# Patient Record
Sex: FEMALE | Race: WHITE | NOT HISPANIC OR LATINO | Employment: FULL TIME | URBAN - METROPOLITAN AREA
[De-identification: names, ages, dates, MRNs, and addresses within clinical notes are randomized per-mention and may not be internally consistent; named-entity substitution may affect disease eponyms.]

---

## 2017-01-09 ENCOUNTER — OFFICE VISIT (OUTPATIENT)
Dept: RETAIL CLINIC | Facility: CLINIC | Age: 27
End: 2017-01-09

## 2017-01-09 VITALS
HEIGHT: 62 IN | RESPIRATION RATE: 14 BRPM | DIASTOLIC BLOOD PRESSURE: 94 MMHG | HEART RATE: 82 BPM | WEIGHT: 224 LBS | OXYGEN SATURATION: 99 % | BODY MASS INDEX: 41.22 KG/M2 | TEMPERATURE: 99.3 F | SYSTOLIC BLOOD PRESSURE: 128 MMHG

## 2017-01-09 DIAGNOSIS — J30.89 ENVIRONMENTAL AND SEASONAL ALLERGIES: ICD-10-CM

## 2017-01-09 DIAGNOSIS — R05.9 COUGH: ICD-10-CM

## 2017-01-09 DIAGNOSIS — I10 ESSENTIAL HYPERTENSION: ICD-10-CM

## 2017-01-09 DIAGNOSIS — J06.9 ACUTE URI: Primary | ICD-10-CM

## 2017-01-09 PROBLEM — E66.01 MORBID OBESITY DUE TO EXCESS CALORIES (HCC): Status: ACTIVE | Noted: 2017-01-09

## 2017-01-09 PROBLEM — Z91.09 ENVIRONMENTAL ALLERGIES: Status: ACTIVE | Noted: 2017-01-09

## 2017-01-09 PROBLEM — E03.9 HYPOTHYROID: Status: ACTIVE | Noted: 2017-01-09

## 2017-01-09 PROCEDURE — 99214 OFFICE O/P EST MOD 30 MIN: CPT | Performed by: FAMILY MEDICINE

## 2017-01-09 RX ORDER — NORETHINDRONE 0.35 MG/1
TABLET ORAL
COMMUNITY
Start: 2016-11-03 | End: 2021-04-21

## 2017-01-09 RX ORDER — LEVOTHYROXINE SODIUM 0.03 MG/1
TABLET ORAL
COMMUNITY
Start: 2016-12-23 | End: 2021-04-21

## 2017-01-09 RX ORDER — LORATADINE 10 MG/1
10 TABLET ORAL DAILY
Qty: 90 TABLET | Refills: 3
Start: 2017-01-09 | End: 2021-04-21

## 2017-01-09 RX ORDER — GUAIFENESIN 600 MG/1
TABLET, EXTENDED RELEASE ORAL
Qty: 20 TABLET | Refills: 0
Start: 2017-01-09 | End: 2021-04-21

## 2017-01-09 RX ORDER — DICYCLOMINE HCL 20 MG
TABLET ORAL
COMMUNITY
Start: 2016-12-23 | End: 2021-04-21

## 2017-01-09 RX ORDER — METOPROLOL SUCCINATE 25 MG/1
TABLET, EXTENDED RELEASE ORAL
COMMUNITY
Start: 2016-12-23 | End: 2021-04-21

## 2017-01-09 RX ORDER — BENZONATATE 100 MG/1
CAPSULE ORAL
Qty: 30 CAPSULE | Refills: 0
Start: 2017-01-09 | End: 2021-04-21

## 2017-01-09 RX ORDER — GUAIFENESIN AND CODEINE PHOSPHATE 100; 10 MG/5ML; MG/5ML
SOLUTION ORAL
Qty: 240 ML | Refills: 0
Start: 2017-01-09 | End: 2021-04-21

## 2017-01-09 NOTE — PROGRESS NOTES
"Subjective   Lorena Bryson is a 26 y.o. female presents for   Chief Complaint   Patient presents with   • Cough     5 days   • Nasal Congestion     5 days   .     History of Present Illness    Lorena has had 5 days of nasal congestion, drainage, and cough.  Has been a little more tired than usual but doesn't think she's had any fevers.  Her  has also been sick with something similar.  She cannot take typical decongestants due to her HTN, so she has tried otc Coricidin and Mucinex, but it doesn't seem like it's helping much.  Is staying awake at night coughing.  Does not drink much water.  Otherwise, things have been going well until 5 days ago.  She does have allergies that she takes otc claritin for, but is not good about taking it every single day.  Overall, her allergies are under \"decent\" control.       The following portions of the patient's history were reviewed and updated as appropriate: allergies, current medications, past family history, past medical history, past social history, past surgical history and problem list.    Review of Systems   Constitutional: Positive for fatigue. Negative for activity change, appetite change, chills, diaphoresis and fever.   HENT: Positive for congestion, postnasal drip, sore throat (dry, itchy) and voice change (more hoarse than usual). Negative for ear discharge, ear pain, facial swelling, mouth sores, nosebleeds, rhinorrhea, sinus pressure, sneezing and trouble swallowing.    Eyes: Negative.    Respiratory: Positive for cough. Negative for apnea, choking, chest tightness, shortness of breath, wheezing and stridor.    Cardiovascular: Negative.    Gastrointestinal: Negative.    Endocrine: Negative.    Genitourinary: Negative.    Musculoskeletal: Negative.    Skin: Negative.    Allergic/Immunologic: Negative.    Neurological: Negative.    Hematological: Negative.    Psychiatric/Behavioral: Negative.        Objective   Vitals:    01/09/17 1000   BP: 128/94   Pulse: " "82   Resp: 14   Temp: 99.3 °F (37.4 °C)   TempSrc: Axillary   SpO2: 99%   Weight: 224 lb (102 kg)   Height: 62\" (157.5 cm)     Body mass index is 40.97 kg/(m^2).    Physical Exam   Constitutional: She is oriented to person, place, and time. She appears well-developed and well-nourished. No distress.   HENT:   Head: Normocephalic and atraumatic.   Right Ear: External ear normal.   Left Ear: External ear normal.   Nose: Nose normal.   Mouth/Throat: Oropharynx is clear and moist. No oropharyngeal exudate.   Eyes: Conjunctivae are normal. Pupils are equal, round, and reactive to light. No scleral icterus.   Neck: Neck supple. No thyromegaly present.   Cardiovascular: Normal rate, regular rhythm, normal heart sounds and intact distal pulses.  Exam reveals no gallop and no friction rub.    No murmur heard.  Pulmonary/Chest: Effort normal and breath sounds normal. No respiratory distress. She has no wheezes. She has no rales.   Lymphadenopathy:     She has no cervical adenopathy.   Neurological: She is alert and oriented to person, place, and time. No cranial nerve deficit.   Skin: Skin is warm and dry. No rash noted.   Psychiatric: She has a normal mood and affect. Her behavior is normal.   Nursing note and vitals reviewed.      Assessment/Plan   Lorena was seen today for cough and nasal congestion.    Diagnoses and all orders for this visit:    Acute URI    Cough    Environmental and seasonal allergies    Essential hypertension   Follows with cardiology.  Controlled today.  Other orders  -     loratadine (CLARITIN) 10 MG tablet; Take 1 tablet by mouth Daily.  -     guaiFENesin (MUCINEX) 600 MG 12 hr tablet; 1-2 tabs po BID prn drainage  -     benzonatate (TESSALON PERLES) 100 MG capsule; 1-2 po TID prn cough  -     guaifenesin-codeine (GUAIFENESIN AC) 100-10 MG/5ML liquid; 10mL po q4-6 hrs prn severe cough   Increase water intake.          "

## 2017-03-10 ENCOUNTER — HOSPITAL ENCOUNTER (OUTPATIENT)
Dept: FAMILY MEDICINE CLINIC | Facility: CLINIC | Age: 27
Setting detail: SPECIMEN
Discharge: HOME | End: 2017-03-10
Attending: FAMILY MEDICINE | Admitting: FAMILY MEDICINE

## 2017-03-10 LAB
ALBUMIN SERPL-MCNC: 3.5 G/DL (ref 3.5–4.8)
ALBUMIN/GLOB SERPL: 1.1 {RATIO} (ref 1–1.7)
ALP SERPL-CCNC: 123 IU/L (ref 32–91)
ALT SERPL-CCNC: 18 IU/L (ref 14–54)
ANION GAP SERPL CALC-SCNC: 13.2 MMOL/L (ref 10–20)
AST SERPL-CCNC: 33 IU/L (ref 15–41)
BILIRUB SERPL-MCNC: 0.7 MG/DL (ref 0.3–1.2)
BUN SERPL-MCNC: 7 MG/DL (ref 8–20)
BUN/CREAT SERPL: 8.8 (ref 5.4–26.2)
CALCIUM SERPL-MCNC: 9.6 MG/DL (ref 8.9–10.3)
CHLORIDE SERPL-SCNC: 105 MMOL/L (ref 101–111)
CONV CO2: 26 MMOL/L (ref 22–32)
CONV TOTAL PROTEIN: 6.6 G/DL (ref 6.1–7.9)
CREAT UR-MCNC: 0.8 MG/DL (ref 0.4–1)
GLOBULIN UR ELPH-MCNC: 3.1 G/DL (ref 2.5–3.8)
GLUCOSE SERPL-MCNC: 112 MG/DL (ref 65–99)
POTASSIUM SERPL-SCNC: 4.2 MMOL/L (ref 3.6–5.1)
SODIUM SERPL-SCNC: 140 MMOL/L (ref 136–144)
TSH SERPL-ACNC: 2.68 UIU/ML (ref 0.34–5.6)
VIT B12 SERPL-MCNC: 221 PG/ML (ref 180–914)

## 2017-09-05 ENCOUNTER — OFFICE VISIT (OUTPATIENT)
Dept: RETAIL CLINIC | Facility: CLINIC | Age: 27
End: 2017-09-05

## 2017-09-05 VITALS — TEMPERATURE: 98.4 F | HEART RATE: 62 BPM | OXYGEN SATURATION: 99 %

## 2017-09-05 DIAGNOSIS — Z79.899 MEDICATION MANAGEMENT: ICD-10-CM

## 2017-09-05 DIAGNOSIS — R53.83 FATIGUE, UNSPECIFIED TYPE: ICD-10-CM

## 2017-09-05 DIAGNOSIS — E03.9 HYPOTHYROIDISM, UNSPECIFIED TYPE: Primary | ICD-10-CM

## 2017-09-05 DIAGNOSIS — Z13.220 SCREENING CHOLESTEROL LEVEL: ICD-10-CM

## 2017-09-05 PROCEDURE — 36415 COLL VENOUS BLD VENIPUNCTURE: CPT | Performed by: FAMILY MEDICINE

## 2017-09-05 PROCEDURE — 99214 OFFICE O/P EST MOD 30 MIN: CPT | Performed by: FAMILY MEDICINE

## 2017-09-05 RX ORDER — LEVOTHYROXINE SODIUM 0.03 MG/1
25 TABLET ORAL DAILY
COMMUNITY
End: 2017-09-05 | Stop reason: SDUPTHER

## 2017-09-05 RX ORDER — METOPROLOL SUCCINATE 25 MG/1
25 TABLET, EXTENDED RELEASE ORAL DAILY
Qty: 30 TABLET | Refills: 11 | Status: SHIPPED | OUTPATIENT
Start: 2017-09-05 | End: 2021-04-21

## 2017-09-05 RX ORDER — METOPROLOL SUCCINATE 25 MG/1
25 TABLET, EXTENDED RELEASE ORAL DAILY
COMMUNITY
End: 2017-09-05 | Stop reason: SDUPTHER

## 2017-09-05 RX ORDER — DICYCLOMINE HYDROCHLORIDE 10 MG/1
20 CAPSULE ORAL 2 TIMES DAILY
Qty: 60 CAPSULE | Refills: 11 | Status: SHIPPED | OUTPATIENT
Start: 2017-09-05 | End: 2021-04-21

## 2017-09-05 RX ORDER — LEVOTHYROXINE SODIUM 0.03 MG/1
25 TABLET ORAL DAILY
Qty: 30 TABLET | Refills: 11 | Status: SHIPPED | OUTPATIENT
Start: 2017-09-05 | End: 2021-04-21

## 2017-09-05 RX ORDER — DICYCLOMINE HYDROCHLORIDE 10 MG/1
20 CAPSULE ORAL DAILY
COMMUNITY
End: 2017-09-05 | Stop reason: SDUPTHER

## 2017-09-05 NOTE — PROGRESS NOTES
"Subjective   Lorena Bryson is a 26 y.o. female presents for   Chief Complaint   Patient presents with   • Med Refill     dicyclomine  Levothyroxine and Metoprolol        History of Present Illness    Lorena is here for medication refills.  She is taking dicyclomine for IBS once to twice daily.  Her IBS is at baseline.  She is also taking Levothyroxine for hypothyroid.  She thinks that her last TSH was checked at the beginning of the year by her previous PCP.  She's not sure what her levels are or what type of hypothyroidism she has.  She has an arrhythmia that she takes Metoprolol for.  She has a follow up appointment with cardiology scheduled and usually sees them on a yearly basis.  She has an upcoming appointment with her gyn for evaluation of possible endometriosis.  Her mother had severe endometriosis requiring several surgeries on her colon.  Her mother struggled with infertility because of it so she \"wants to get on top of it, sooner rather than later.\"  She states that her periods are getting worse with pain and heaviness, where she is passing clots.  She says that the \"miserable part\" lasts for a day and then the rest of period is fairly light and lasts 4 more days on average.  She used to be on a birth control pill to see if it would help her periods, but it \"made me foggy\", so she stopped taking it.  She is unsure of the name.      The following portions of the patient's history were reviewed and updated as appropriate: allergies, current medications, past family history, past medical history, past social history, past surgical history and problem list.    Review of Systems   Constitutional: Positive for fatigue. Negative for activity change, appetite change, chills, diaphoresis, fever and unexpected weight change.   HENT: Negative.    Eyes: Negative.    Respiratory: Negative.    Cardiovascular: Negative.    Gastrointestinal: Negative.    Endocrine: Negative.    Genitourinary: Positive for menstrual " problem and pelvic pain. Negative for decreased urine volume, difficulty urinating, dyspareunia, dysuria, enuresis, flank pain, frequency, genital sores, hematuria, urgency, vaginal bleeding, vaginal discharge and vaginal pain.   Musculoskeletal: Negative.    Skin: Negative.    Allergic/Immunologic: Negative.    Neurological: Negative.    Hematological: Negative.    Psychiatric/Behavioral: Negative.        Objective   Vitals:    09/05/17 1105   Pulse: 62   Temp: 98.4 °F (36.9 °C)   TempSrc: Oral   SpO2: 99%       Physical Exam   Constitutional: She appears well-developed and well-nourished. No distress.   HENT:   Head: Normocephalic and atraumatic.   Right Ear: Tympanic membrane, external ear and ear canal normal.   Left Ear: Tympanic membrane, external ear and ear canal normal.   Nose: Nose normal.   Mouth/Throat: Uvula is midline, oropharynx is clear and moist and mucous membranes are normal. No oropharyngeal exudate. No tonsillar exudate.   Eyes: Conjunctivae and EOM are normal. Pupils are equal, round, and reactive to light. No scleral icterus.   Neck: Neck supple. No thyromegaly present.   Cardiovascular: Normal rate, regular rhythm, S1 normal, S2 normal, normal heart sounds and intact distal pulses.   No extrasystoles are present. Exam reveals no gallop, no S3, no S4, no distant heart sounds and no friction rub.    No murmur heard.  Pulmonary/Chest: Effort normal and breath sounds normal. No respiratory distress. She has no decreased breath sounds. She has no wheezes. She has no rhonchi. She has no rales.   Lymphadenopathy:     She has no cervical adenopathy.   Skin: Skin is warm and dry. No rash noted. She is not diaphoretic.   Psychiatric: She has a normal mood and affect.   Nursing note and vitals reviewed.      Assessment/Plan   Lorena was seen today for med refill.    Diagnoses and all orders for this visit:    Hypothyroidism, unspecified type  -     TSH  -     T4, free    Medication management  -      Comprehensive Metabolic Panel    Fatigue, unspecified type  -     CBC & Differential  -     Vitamin D 25 Hydroxy  -     Hemoglobin A1c    Screening cholesterol level  -     Lipid Panel    Other orders  -     dicyclomine (BENTYL) 10 MG capsule; Take 2 capsules by mouth 2 (Two) Times a Day.  -     levothyroxine (SYNTHROID, LEVOTHROID) 25 MCG tablet; Take 1 tablet by mouth Daily.  -     metoprolol succinate XL (TOPROL-XL) 25 MG 24 hr tablet; Take 1 tablet by mouth Daily.     Keep appointments with gyn and cardiology.

## 2017-09-06 ENCOUNTER — TELEPHONE (OUTPATIENT)
Dept: RETAIL CLINIC | Facility: CLINIC | Age: 27
End: 2017-09-06

## 2017-09-06 LAB
25(OH)D3+25(OH)D2 SERPL-MCNC: 21.5 NG/ML (ref 30–100)
ALBUMIN SERPL-MCNC: 4.5 G/DL (ref 3.5–5.5)
ALBUMIN/GLOB SERPL: 1.5 {RATIO} (ref 1.2–2.2)
ALP SERPL-CCNC: 123 IU/L (ref 39–117)
ALT SERPL-CCNC: 15 IU/L (ref 0–32)
AST SERPL-CCNC: 30 IU/L (ref 0–40)
BASOPHILS # BLD AUTO: 0 X10E3/UL (ref 0–0.2)
BASOPHILS NFR BLD AUTO: 0 %
BILIRUB SERPL-MCNC: 0.6 MG/DL (ref 0–1.2)
BUN SERPL-MCNC: 9 MG/DL (ref 6–20)
BUN/CREAT SERPL: 12 (ref 9–23)
CALCIUM SERPL-MCNC: 9.8 MG/DL (ref 8.7–10.2)
CHLORIDE SERPL-SCNC: 100 MMOL/L (ref 96–106)
CHOLEST SERPL-MCNC: 260 MG/DL (ref 100–199)
CO2 SERPL-SCNC: 20 MMOL/L (ref 18–29)
CREAT SERPL-MCNC: 0.76 MG/DL (ref 0.57–1)
EOSINOPHIL # BLD AUTO: 0.2 X10E3/UL (ref 0–0.4)
EOSINOPHIL NFR BLD AUTO: 3 %
ERYTHROCYTE [DISTWIDTH] IN BLOOD BY AUTOMATED COUNT: 13.4 % (ref 12.3–15.4)
GLOBULIN SER CALC-MCNC: 3 G/DL (ref 1.5–4.5)
GLUCOSE SERPL-MCNC: 93 MG/DL (ref 65–99)
HBA1C MFR BLD: 5.1 % (ref 4.8–5.6)
HCT VFR BLD AUTO: 43.2 % (ref 34–46.6)
HDLC SERPL-MCNC: 49 MG/DL
HGB BLD-MCNC: 14.3 G/DL (ref 11.1–15.9)
IMM GRANULOCYTES # BLD: 0 X10E3/UL (ref 0–0.1)
IMM GRANULOCYTES NFR BLD: 0 %
LDLC SERPL CALC-MCNC: 179 MG/DL (ref 0–99)
LYMPHOCYTES # BLD AUTO: 1.6 X10E3/UL (ref 0.7–3.1)
LYMPHOCYTES NFR BLD AUTO: 24 %
MCH RBC QN AUTO: 28.4 PG (ref 26.6–33)
MCHC RBC AUTO-ENTMCNC: 33.1 G/DL (ref 31.5–35.7)
MCV RBC AUTO: 86 FL (ref 79–97)
MONOCYTES # BLD AUTO: 0.4 X10E3/UL (ref 0.1–0.9)
MONOCYTES NFR BLD AUTO: 6 %
NEUTROPHILS # BLD AUTO: 4.6 X10E3/UL (ref 1.4–7)
NEUTROPHILS NFR BLD AUTO: 67 %
PLATELET # BLD AUTO: 270 X10E3/UL (ref 150–379)
POTASSIUM SERPL-SCNC: 4.3 MMOL/L (ref 3.5–5.2)
PROT SERPL-MCNC: 7.5 G/DL (ref 6–8.5)
RBC # BLD AUTO: 5.04 X10E6/UL (ref 3.77–5.28)
SODIUM SERPL-SCNC: 140 MMOL/L (ref 134–144)
T4 FREE SERPL-MCNC: 1.45 NG/DL (ref 0.82–1.77)
TRIGL SERPL-MCNC: 159 MG/DL (ref 0–149)
TSH SERPL DL<=0.005 MIU/L-ACNC: 2.64 UIU/ML (ref 0.45–4.5)
VLDLC SERPL CALC-MCNC: 32 MG/DL (ref 5–40)
WBC # BLD AUTO: 6.8 X10E3/UL (ref 3.4–10.8)

## 2017-09-06 NOTE — TELEPHONE ENCOUNTER
----- Message from Kalyn Ashby MD sent at 9/6/2017  9:57 AM EDT -----  Labs show:  Normal cell counts, no anemia, normal platelets  Normal blood sugar, electrolytes, kidneys, and liver  Normal thyroid (I would continue the Levothyroxine 25mcg daily)  Normal diabetes screen  Low vitamin D.  Start OTC vitamin D at 4000 IU daily.  High cholesterol.  Work on diet, exercise, weight loss and recheck in 3 months.  Patient notified of the above. No questions at this time

## 2021-01-26 ENCOUNTER — E-VISIT (OUTPATIENT)
Dept: FAMILY MEDICINE CLINIC | Facility: TELEHEALTH | Age: 31
End: 2021-01-26

## 2021-01-26 DIAGNOSIS — Z20.822 EXPOSURE TO COVID-19 VIRUS: Primary | ICD-10-CM

## 2021-01-26 PROCEDURE — 99422 OL DIG E/M SVC 11-20 MIN: CPT | Performed by: NURSE PRACTITIONER

## 2021-01-27 NOTE — PATIENT INSTRUCTIONS

## 2021-01-27 NOTE — PROGRESS NOTES
Lorena Bryson    1990  [id number]    I have reviewed the e-Visit questionnaire and patient's answers, my assessment and plan are as follows:      HPI  Lorena Bryson is a 30 y.o. with exposure to COVID. She reports she has been having allergy symptoms. Denies any fever, chills. Nonsmoker. Reports she was exposed to someone with flu in the last 2 weeks. Denies any body aches. Denies taking any medication for her symptoms. Denies pregnancy or breastfeeding. LMP 1-1-21.    Review of Systems - Negative except as below  History obtained from chart review and the patient  Allergy and Immunology ROS: positive for - seasonal allergies      Diagnoses and all orders for this visit:    1. Exposure to COVID-19 virus (Primary)  -     COVID-19,[facility] ROUTINE, NP/OP SWAB IN TRANSPORT MEDIA OR ESWAB 72 HR TAT - Swab, Nasopharynx; Future    quarantine for total of 10 days from first day of symptoms   ER for any worsening symptoms   F/u with PCP for persistent symptoms     Any medications prescribed have been sent electronically to   [facility] - [address] [place] [address] - [phone]  - [phone] [address] [place] [address]  Phone: [phone] Fax: [phone]      Time Documentation  Counseled patient  Counseling topics: diagnosis, treatment options, follow up plan and return instructions  Total encounter time: counseling time more than 50% of visit: 15 minutes        Johanna Earl, APRN  01/26/21  23:40 EST

## 2021-04-21 ENCOUNTER — OFFICE VISIT (OUTPATIENT)
Dept: FAMILY MEDICINE CLINIC | Facility: CLINIC | Age: 31
End: 2021-04-21

## 2021-04-21 VITALS
BODY MASS INDEX: 39.51 KG/M2 | TEMPERATURE: 97.3 F | SYSTOLIC BLOOD PRESSURE: 137 MMHG | HEART RATE: 108 BPM | OXYGEN SATURATION: 95 % | WEIGHT: 223 LBS | DIASTOLIC BLOOD PRESSURE: 103 MMHG | HEIGHT: 63 IN

## 2021-04-21 DIAGNOSIS — Z00.00 WELLNESS EXAMINATION: Primary | ICD-10-CM

## 2021-04-21 DIAGNOSIS — E55.9 VITAMIN D DEFICIENCY: ICD-10-CM

## 2021-04-21 PROCEDURE — 99395 PREV VISIT EST AGE 18-39: CPT | Performed by: FAMILY MEDICINE

## 2021-05-07 ENCOUNTER — LAB (OUTPATIENT)
Dept: FAMILY MEDICINE CLINIC | Facility: CLINIC | Age: 31
End: 2021-05-07

## 2021-05-07 PROCEDURE — 84443 ASSAY THYROID STIM HORMONE: CPT | Performed by: FAMILY MEDICINE

## 2021-05-07 PROCEDURE — 36415 COLL VENOUS BLD VENIPUNCTURE: CPT | Performed by: FAMILY MEDICINE

## 2021-05-07 PROCEDURE — 80053 COMPREHEN METABOLIC PANEL: CPT | Performed by: FAMILY MEDICINE

## 2021-05-07 PROCEDURE — 80061 LIPID PANEL: CPT | Performed by: FAMILY MEDICINE

## 2021-05-07 PROCEDURE — 82306 VITAMIN D 25 HYDROXY: CPT | Performed by: FAMILY MEDICINE

## 2021-05-08 LAB
25(OH)D3 SERPL-MCNC: 12.9 NG/ML (ref 30–100)
ALBUMIN SERPL-MCNC: 4.1 G/DL (ref 3.5–5.2)
ALBUMIN/GLOB SERPL: 1.5 G/DL
ALP SERPL-CCNC: 116 U/L (ref 39–117)
ALT SERPL W P-5'-P-CCNC: 11 U/L (ref 1–33)
ANION GAP SERPL CALCULATED.3IONS-SCNC: 13.3 MMOL/L (ref 5–15)
AST SERPL-CCNC: 26 U/L (ref 1–32)
BILIRUB SERPL-MCNC: 0.5 MG/DL (ref 0–1.2)
BUN SERPL-MCNC: 8 MG/DL (ref 6–20)
BUN/CREAT SERPL: 11.9 (ref 7–25)
CALCIUM SPEC-SCNC: 9.4 MG/DL (ref 8.6–10.5)
CHLORIDE SERPL-SCNC: 105 MMOL/L (ref 98–107)
CHOLEST SERPL-MCNC: 239 MG/DL (ref 0–200)
CO2 SERPL-SCNC: 22.7 MMOL/L (ref 22–29)
CREAT SERPL-MCNC: 0.67 MG/DL (ref 0.57–1)
GFR SERPL CREATININE-BSD FRML MDRD: 103 ML/MIN/1.73
GLOBULIN UR ELPH-MCNC: 2.8 GM/DL
GLUCOSE SERPL-MCNC: 88 MG/DL (ref 65–99)
HDLC SERPL-MCNC: 48 MG/DL (ref 40–60)
LDLC SERPL CALC-MCNC: 164 MG/DL (ref 0–100)
LDLC/HDLC SERPL: 3.36 {RATIO}
POTASSIUM SERPL-SCNC: 4 MMOL/L (ref 3.5–5.2)
PROT SERPL-MCNC: 6.9 G/DL (ref 6–8.5)
SODIUM SERPL-SCNC: 141 MMOL/L (ref 136–145)
TRIGL SERPL-MCNC: 148 MG/DL (ref 0–150)
TSH SERPL DL<=0.05 MIU/L-ACNC: 3.62 UIU/ML (ref 0.27–4.2)
VLDLC SERPL-MCNC: 27 MG/DL (ref 5–40)

## 2021-05-09 PROBLEM — E55.9 VITAMIN D DEFICIENCY: Status: ACTIVE | Noted: 2021-05-09

## 2021-05-09 PROBLEM — Z00.00 WELLNESS EXAMINATION: Status: ACTIVE | Noted: 2021-05-09

## 2021-05-11 RX ORDER — ERGOCALCIFEROL 1.25 MG/1
50000 CAPSULE ORAL WEEKLY
Qty: 13 CAPSULE | Refills: 0 | Status: SHIPPED | OUTPATIENT
Start: 2021-05-11 | End: 2021-08-10 | Stop reason: SDUPTHER

## 2021-08-10 RX ORDER — ERGOCALCIFEROL 1.25 MG/1
50000 CAPSULE ORAL WEEKLY
Qty: 13 CAPSULE | Refills: 0 | Status: CANCELLED | OUTPATIENT
Start: 2021-08-10

## 2021-08-13 RX ORDER — ERGOCALCIFEROL 1.25 MG/1
50000 CAPSULE ORAL WEEKLY
Qty: 13 CAPSULE | Refills: 0 | Status: SHIPPED | OUTPATIENT
Start: 2021-08-13

## 2021-12-20 ENCOUNTER — TELEMEDICINE (OUTPATIENT)
Dept: FAMILY MEDICINE CLINIC | Facility: CLINIC | Age: 31
End: 2021-12-20

## 2021-12-20 ENCOUNTER — LAB (OUTPATIENT)
Dept: LAB | Facility: HOSPITAL | Age: 31
End: 2021-12-20

## 2021-12-20 DIAGNOSIS — J06.9 ACUTE URI: Primary | ICD-10-CM

## 2021-12-20 PROCEDURE — U0004 COV-19 TEST NON-CDC HGH THRU: HCPCS | Performed by: FAMILY MEDICINE

## 2021-12-20 PROCEDURE — 99213 OFFICE O/P EST LOW 20 MIN: CPT | Performed by: FAMILY MEDICINE

## 2021-12-20 RX ORDER — AZITHROMYCIN 250 MG/1
TABLET, FILM COATED ORAL
Qty: 6 TABLET | Refills: 0 | Status: SHIPPED | OUTPATIENT
Start: 2021-12-20 | End: 2021-12-29

## 2021-12-20 NOTE — PROGRESS NOTES
Chief Complaint  Cough    You have chosen to receive care through a telehealth visit.  Do you consent to use a video/audio connection for your medical care today? Yes    Patient presents during the COVID-19 pandemic/federally declared state of public health emergency.  This service was conducted via [facility]  real-time audio/visual.      Subjective          Lorena Bryson presents to [facility]  No known exposure to COVID.  No loss of taste or smell.  No fever or chills.    Cough  This is a new problem. The current episode started in the past 7 days. The problem has been unchanged. The cough is non-productive. Associated symptoms include ear congestion and a sore throat. Pertinent negatives include no chills or fever. Nothing aggravates the symptoms. She has tried nothing for the symptoms.       Objective   Vital Signs:   There were no vitals taken for this visit.    Physical Exam  Pulmonary:      Effort: Pulmonary effort is normal.   Neurological:      Mental Status: She is alert.   Psychiatric:         Mood and Affect: Mood normal.        Result Review :   The following data was reviewed by: Eden Rojas MD on 12/20/2021:  Common labs    Common Labsle 5/7/21 5/7/21    1326 1326   Glucose 88    BUN 8    Creatinine 0.67    eGFR Non African Am 103    Sodium 141    Potassium 4.0    Chloride 105    Calcium 9.4    Albumin 4.10    Total Bilirubin 0.5    Alkaline Phosphatase 116    AST (SGOT) 26    ALT (SGPT) 11    Total Cholesterol  239 (A)   Triglycerides  148   HDL Cholesterol  48   LDL Cholesterol   164 (A)   (A) Abnormal value                      Assessment and Plan    Diagnoses and all orders for this visit:    1. Acute URI (Primary)  -     COVID-19,[facility] PANTHER([facility]), [facility]/ [facility], NP/OP SWAB IN UTM/VTM/SALINE TRANSPORT MEDIA,24 HR TAT - Swab, Nasal Cavity    Other orders  -     azithromycin (Zithromax Z-Ricci) 250 MG tablet; Take 2 tablets the first day, then 1 tablet daily  for 4 days.  Dispense: 6 tablet; Refill: 0        Follow Up   No follow-ups on file.  Patient was given instructions and counseling regarding her condition or for health maintenance advice. Please see specific information pulled into the AVS if appropriate.

## 2021-12-21 LAB — SARS-COV-2 ORF1AB RESP QL NAA+PROBE: DETECTED

## 2021-12-22 ENCOUNTER — TELEPHONE (OUTPATIENT)
Dept: FAMILY MEDICINE CLINIC | Facility: CLINIC | Age: 31
End: 2021-12-22

## 2021-12-22 RX ORDER — BENZONATATE 200 MG/1
200 CAPSULE ORAL 3 TIMES DAILY PRN
Qty: 30 CAPSULE | Refills: 0 | Status: SHIPPED | OUTPATIENT
Start: 2021-12-22

## 2021-12-22 NOTE — TELEPHONE ENCOUNTER
Caller: Lorena Bryson    Relationship: Self    Best call back numbeR [phone]    What medication are you requesting: COUGH MEDICATION, NICKSALON ALMA ROSA    What are your current symptoms: PATIENT DIAGNOSED WITH COVID HAS ZPACK COUGH GETTING WORSE    If a prescription is needed, what is your preferred pharmacy and phone number:    [facility]  [phone]    Additional notes:PLEASE ADVISE

## 2021-12-29 ENCOUNTER — OFFICE VISIT (OUTPATIENT)
Dept: FAMILY MEDICINE CLINIC | Facility: CLINIC | Age: 31
End: 2021-12-29

## 2021-12-29 ENCOUNTER — HOSPITAL ENCOUNTER (OUTPATIENT)
Dept: GENERAL RADIOLOGY | Facility: HOSPITAL | Age: 31
Discharge: HOME | End: 2021-12-29
Admitting: FAMILY MEDICINE

## 2021-12-29 VITALS
DIASTOLIC BLOOD PRESSURE: 109 MMHG | BODY MASS INDEX: 39.15 KG/M2 | TEMPERATURE: 97.8 F | WEIGHT: 221 LBS | HEART RATE: 82 BPM | SYSTOLIC BLOOD PRESSURE: 156 MMHG | OXYGEN SATURATION: 98 %

## 2021-12-29 DIAGNOSIS — U07.1 COVID-19 VIRUS INFECTION: Primary | ICD-10-CM

## 2021-12-29 PROCEDURE — 71046 X-RAY EXAM CHEST 2 VIEWS: CPT

## 2021-12-29 PROCEDURE — 99213 OFFICE O/P EST LOW 20 MIN: CPT | Performed by: FAMILY MEDICINE

## 2021-12-29 RX ORDER — PREDNISONE 10 MG/1
10 TABLET ORAL 2 TIMES DAILY
Qty: 10 TABLET | Refills: 0 | Status: SHIPPED | OUTPATIENT
Start: 2021-12-29

## 2021-12-29 RX ORDER — CYCLOBENZAPRINE HCL 5 MG
5-10 TABLET ORAL
COMMUNITY
Start: 2021-12-22

## 2021-12-30 ENCOUNTER — HOSPITAL ENCOUNTER (EMERGENCY)
Facility: HOSPITAL | Age: 31
Discharge: HOME | End: 2021-12-30
Admitting: EMERGENCY MEDICINE

## 2021-12-30 ENCOUNTER — APPOINTMENT (OUTPATIENT)
Dept: GENERAL RADIOLOGY | Facility: HOSPITAL | Age: 31
End: 2021-12-30

## 2021-12-30 VITALS
WEIGHT: 222.66 LBS | HEIGHT: 63 IN | RESPIRATION RATE: 21 BRPM | DIASTOLIC BLOOD PRESSURE: 96 MMHG | SYSTOLIC BLOOD PRESSURE: 142 MMHG | OXYGEN SATURATION: 99 % | HEART RATE: 89 BPM | TEMPERATURE: 98.4 F | BODY MASS INDEX: 39.45 KG/M2

## 2021-12-30 DIAGNOSIS — I10 ELEVATED BLOOD PRESSURE READING WITH DIAGNOSIS OF HYPERTENSION: Primary | ICD-10-CM

## 2021-12-30 DIAGNOSIS — F41.9 ANXIETY: ICD-10-CM

## 2021-12-30 LAB
ALBUMIN SERPL-MCNC: 3.9 G/DL (ref 3.5–5.2)
ALBUMIN/GLOB SERPL: 1.2 G/DL
ALP SERPL-CCNC: 110 U/L (ref 39–117)
ALT SERPL W P-5'-P-CCNC: 11 U/L (ref 1–33)
ANION GAP SERPL CALCULATED.3IONS-SCNC: 12 MMOL/L (ref 5–15)
AST SERPL-CCNC: 21 U/L (ref 1–32)
BACTERIA UR QL AUTO: ABNORMAL /HPF
BASOPHILS # BLD AUTO: 0.1 10*3/MM3 (ref 0–0.2)
BASOPHILS NFR BLD AUTO: 0.7 % (ref 0–1.5)
BILIRUB SERPL-MCNC: 0.2 MG/DL (ref 0–1.2)
BILIRUB UR QL STRIP: NEGATIVE
BUN SERPL-MCNC: 11 MG/DL (ref 6–20)
BUN/CREAT SERPL: 13.9 (ref 7–25)
CALCIUM SPEC-SCNC: 9.3 MG/DL (ref 8.6–10.5)
CHLORIDE SERPL-SCNC: 104 MMOL/L (ref 98–107)
CLARITY UR: CLEAR
CO2 SERPL-SCNC: 23 MMOL/L (ref 22–29)
COLOR UR: YELLOW
CREAT SERPL-MCNC: 0.79 MG/DL (ref 0.57–1)
DEPRECATED RDW RBC AUTO: 38.5 FL (ref 37–54)
EOSINOPHIL # BLD AUTO: 0.2 10*3/MM3 (ref 0–0.4)
EOSINOPHIL NFR BLD AUTO: 2.5 % (ref 0.3–6.2)
ERYTHROCYTE [DISTWIDTH] IN BLOOD BY AUTOMATED COUNT: 13.4 % (ref 12.3–15.4)
GFR SERPL CREATININE-BSD FRML MDRD: 84 ML/MIN/1.73
GLOBULIN UR ELPH-MCNC: 3.3 GM/DL
GLUCOSE SERPL-MCNC: 102 MG/DL (ref 65–99)
GLUCOSE UR STRIP-MCNC: NEGATIVE MG/DL
HCG SERPL QL: NEGATIVE
HCT VFR BLD AUTO: 38.2 % (ref 34–46.6)
HGB BLD-MCNC: 12.9 G/DL (ref 12–15.9)
HGB UR QL STRIP.AUTO: ABNORMAL
HOLD SPECIMEN: NORMAL
HOLD SPECIMEN: NORMAL
HYALINE CASTS UR QL AUTO: ABNORMAL /LPF
KETONES UR QL STRIP: NEGATIVE
LEUKOCYTE ESTERASE UR QL STRIP.AUTO: NEGATIVE
LYMPHOCYTES # BLD AUTO: 2.8 10*3/MM3 (ref 0.7–3.1)
LYMPHOCYTES NFR BLD AUTO: 35.3 % (ref 19.6–45.3)
MCH RBC QN AUTO: 28 PG (ref 26.6–33)
MCHC RBC AUTO-ENTMCNC: 33.8 G/DL (ref 31.5–35.7)
MCV RBC AUTO: 82.8 FL (ref 79–97)
MONOCYTES # BLD AUTO: 0.5 10*3/MM3 (ref 0.1–0.9)
MONOCYTES NFR BLD AUTO: 6.4 % (ref 5–12)
NEUTROPHILS NFR BLD AUTO: 4.4 10*3/MM3 (ref 1.7–7)
NEUTROPHILS NFR BLD AUTO: 55.1 % (ref 42.7–76)
NITRITE UR QL STRIP: NEGATIVE
NRBC BLD AUTO-RTO: 0.1 /100 WBC (ref 0–0.2)
PH UR STRIP.AUTO: 6 [PH] (ref 5–8)
PLATELET # BLD AUTO: 270 10*3/MM3 (ref 140–450)
PMV BLD AUTO: 8.4 FL (ref 6–12)
POTASSIUM SERPL-SCNC: 3.6 MMOL/L (ref 3.5–5.2)
PROT SERPL-MCNC: 7.2 G/DL (ref 6–8.5)
PROT UR QL STRIP: NEGATIVE
RBC # BLD AUTO: 4.61 10*6/MM3 (ref 3.77–5.28)
RBC # UR STRIP: ABNORMAL /HPF
REF LAB TEST METHOD: ABNORMAL
SODIUM SERPL-SCNC: 139 MMOL/L (ref 136–145)
SP GR UR STRIP: 1.01 (ref 1–1.03)
SQUAMOUS #/AREA URNS HPF: ABNORMAL /HPF
TROPONIN T SERPL-MCNC: <0.01 NG/ML (ref 0–0.03)
TROPONIN T SERPL-MCNC: <0.01 NG/ML (ref 0–0.03)
UROBILINOGEN UR QL STRIP: ABNORMAL
WBC # UR STRIP: ABNORMAL /HPF
WBC NRBC COR # BLD: 8 10*3/MM3 (ref 3.4–10.8)
WHOLE BLOOD HOLD SPECIMEN: NORMAL
WHOLE BLOOD HOLD SPECIMEN: NORMAL

## 2021-12-30 PROCEDURE — 84703 CHORIONIC GONADOTROPIN ASSAY: CPT | Performed by: NURSE PRACTITIONER

## 2021-12-30 PROCEDURE — 93005 ELECTROCARDIOGRAM TRACING: CPT | Performed by: NURSE PRACTITIONER

## 2021-12-30 PROCEDURE — 99284 EMERGENCY DEPT VISIT MOD MDM: CPT

## 2021-12-30 PROCEDURE — 85025 COMPLETE CBC W/AUTO DIFF WBC: CPT | Performed by: NURSE PRACTITIONER

## 2021-12-30 PROCEDURE — 80053 COMPREHEN METABOLIC PANEL: CPT | Performed by: NURSE PRACTITIONER

## 2021-12-30 PROCEDURE — 81001 URINALYSIS AUTO W/SCOPE: CPT | Performed by: NURSE PRACTITIONER

## 2021-12-30 PROCEDURE — 84484 ASSAY OF TROPONIN QUANT: CPT | Performed by: NURSE PRACTITIONER

## 2021-12-30 PROCEDURE — 71045 X-RAY EXAM CHEST 1 VIEW: CPT

## 2021-12-30 RX ORDER — CLONIDINE HYDROCHLORIDE 0.1 MG/1
0.1 TABLET ORAL ONCE
Status: COMPLETED | OUTPATIENT
Start: 2021-12-30 | End: 2021-12-30

## 2021-12-30 RX ORDER — HYDROXYZINE HYDROCHLORIDE 25 MG/1
50 TABLET, FILM COATED ORAL EVERY 8 HOURS PRN
Qty: 15 TABLET | Refills: 0 | Status: SHIPPED | OUTPATIENT
Start: 2021-12-30 | End: 2022-01-04

## 2021-12-30 RX ORDER — ASPIRIN 81 MG/1
324 TABLET, CHEWABLE ORAL ONCE
Status: COMPLETED | OUTPATIENT
Start: 2021-12-30 | End: 2021-12-30

## 2021-12-30 RX ORDER — SODIUM CHLORIDE 0.9 % (FLUSH) 0.9 %
10 SYRINGE (ML) INJECTION AS NEEDED
Status: DISCONTINUED | OUTPATIENT
Start: 2021-12-30 | End: 2021-12-30 | Stop reason: HOSPADM

## 2021-12-30 RX ORDER — CLONIDINE HYDROCHLORIDE 0.1 MG/1
0.1 TABLET ORAL ONCE
Status: DISCONTINUED | OUTPATIENT
Start: 2021-12-30 | End: 2021-12-30 | Stop reason: SDUPTHER

## 2021-12-30 RX ORDER — METOPROLOL SUCCINATE 25 MG/1
25 TABLET, EXTENDED RELEASE ORAL DAILY
Qty: 30 TABLET | Refills: 0 | Status: SHIPPED | OUTPATIENT
Start: 2021-12-30 | End: 2022-02-02 | Stop reason: SDUPTHER

## 2021-12-30 RX ORDER — HYDROXYZINE HYDROCHLORIDE 25 MG/1
50 TABLET, FILM COATED ORAL ONCE
Status: COMPLETED | OUTPATIENT
Start: 2021-12-30 | End: 2021-12-30

## 2021-12-30 RX ADMIN — ASPIRIN 81 MG CHEWABLE TABLET 324 MG: 81 TABLET CHEWABLE at 02:05

## 2021-12-30 RX ADMIN — CLONIDINE HYDROCHLORIDE 0.1 MG: 0.1 TABLET ORAL at 02:27

## 2021-12-30 RX ADMIN — HYDROXYZINE HYDROCHLORIDE 50 MG: 25 TABLET, FILM COATED ORAL at 04:44

## 2022-01-01 LAB — QT INTERVAL: 370 MS

## 2022-01-07 ENCOUNTER — PATIENT OUTREACH (OUTPATIENT)
Dept: CASE MANAGEMENT | Facility: OTHER | Age: 32
End: 2022-01-07

## 2022-01-07 NOTE — OUTREACH NOTE
Spoke to patient. Pt has good family support. Introduced self, explained ECM RN role and provided contact information. Education provided for CM  program. Pt has follow up appointments scheduled. Pt declined needs or concerns at this time. Pt thanks RN-ECM for calling. Advised pt to call RN-ECM or [facility] nurse line with any needs. Follow up outreach as needed  .Ambulatory Case Management Note        Jj Ortiz RN  Ambulatory Case Management    1/7/2022, 14:54 EST

## 2022-02-02 ENCOUNTER — OFFICE VISIT (OUTPATIENT)
Dept: FAMILY MEDICINE CLINIC | Facility: CLINIC | Age: 32
End: 2022-02-02

## 2022-02-02 VITALS
WEIGHT: 224 LBS | DIASTOLIC BLOOD PRESSURE: 92 MMHG | OXYGEN SATURATION: 98 % | TEMPERATURE: 98 F | SYSTOLIC BLOOD PRESSURE: 150 MMHG | HEART RATE: 101 BPM | BODY MASS INDEX: 39.68 KG/M2

## 2022-02-02 DIAGNOSIS — F41.9 ANXIETY: ICD-10-CM

## 2022-02-02 DIAGNOSIS — I10 PRIMARY HYPERTENSION: Primary | ICD-10-CM

## 2022-02-02 PROCEDURE — 99214 OFFICE O/P EST MOD 30 MIN: CPT | Performed by: FAMILY MEDICINE

## 2022-02-02 RX ORDER — ESCITALOPRAM OXALATE 5 MG/1
5 TABLET ORAL DAILY
Qty: 90 TABLET | Refills: 1 | Status: SHIPPED | OUTPATIENT
Start: 2022-02-02 | End: 2022-09-09 | Stop reason: SDUPTHER

## 2022-02-02 RX ORDER — METOPROLOL SUCCINATE 25 MG/1
25 TABLET, EXTENDED RELEASE ORAL DAILY
Qty: 90 TABLET | Refills: 1 | Status: SHIPPED | OUTPATIENT
Start: 2022-02-02 | End: 2022-09-09 | Stop reason: SDUPTHER

## 2022-02-02 NOTE — PROGRESS NOTES
Chief Complaint  Hypertension (f/u)    Subjective     {Problem List  Visit Diagnosis   Encounters  Notes  Medications  Labs  Result Review Imaging  Media :23}     Lorena Bryson presents to [facility]  Hypertension  The current episode started more than 1 month ago. The problem has been gradually worsening since onset. The problem is uncontrolled. Associated symptoms include anxiety. Pertinent negatives include no chest pain. There are no associated agents to hypertension. Current antihypertension treatment includes lifestyle changes. The current treatment provides no improvement. There are no compliance problems.    Anxiety  Presents for initial visit. Onset was 1 to 6 months ago. The problem has been gradually worsening. Symptoms include nervous/anxious behavior. Patient reports no chest pain, decreased concentration or depressed mood. Symptoms occur most days. The severity of symptoms is moderate.           Objective   Vital Signs:   /92 (BP Location: Left arm, Patient Position: Sitting, Cuff Size: Adult)   Pulse 101   Temp 98 °F (36.7 °C) (Infrared)   Wt 102 kg (224 lb)   SpO2 98%   BMI 39.68 kg/m²     Physical Exam  Constitutional:       General: She is not in acute distress.     Appearance: She is well-developed.   HENT:      Head: Normocephalic.   Eyes:      General: Lids are normal.      Conjunctiva/sclera: Conjunctivae normal.   Neck:      Thyroid: No thyroid mass or thyromegaly.      Trachea: Trachea normal.   Cardiovascular:      Rate and Rhythm: Normal rate and regular rhythm.      Heart sounds: Normal heart sounds.   Pulmonary:      Effort: Pulmonary effort is normal.      Breath sounds: Normal breath sounds.   Musculoskeletal:      Cervical back: Normal range of motion.   Lymphadenopathy:      Cervical: No cervical adenopathy.   Skin:     General: Skin is warm and dry.   Neurological:      Mental Status: She is alert and oriented to person, place,  and time.   Psychiatric:         Attention and Perception: She is attentive.         Mood and Affect: Mood is anxious.         Speech: Speech normal.         Behavior: Behavior normal.        Result Review :   The following data was reviewed by: Eden Rojas MD on 02/02/2022:  Common labs    Common Labsle 5/7/21 5/7/21 12/30/21 12/30/21    1326 1326 0149 0149   Glucose 88   102 (A)   BUN 8   11   Creatinine 0.67   0.79   eGFR Non African Am 103   84   Sodium 141   139   Potassium 4.0   3.6   Chloride 105   104   Calcium 9.4   9.3   Albumin 4.10   3.90   Total Bilirubin 0.5   0.2   Alkaline Phosphatase 116   110   AST (SGOT) 26   21   ALT (SGPT) 11   11   WBC   8.00    Hemoglobin   12.9    Hematocrit   38.2    Platelets   270    Total Cholesterol  239 (A)     Triglycerides  148     HDL Cholesterol  48     LDL Cholesterol   164 (A)     (A) Abnormal value                      Assessment and Plan    Diagnoses and all orders for this visit:    1. Primary hypertension (Primary)  -     metoprolol succinate XL (TOPROL-XL) 25 MG 24 hr tablet; Take 1 tablet by mouth Daily.  Dispense: 90 tablet; Refill: 1    2. Anxiety  -     escitalopram (Lexapro) 5 MG tablet; Take 1 tablet by mouth Daily.  Dispense: 90 tablet; Refill: 1        Follow Up   No follow-ups on file.  Patient was given instructions and counseling regarding her condition or for health maintenance advice. Please see specific information pulled into the AVS if appropriate.       Answers for HPI/ROS submitted by the patient on 1/31/2022  What is the primary reason for your visit?: High Blood Pressure

## 2022-09-09 DIAGNOSIS — F41.9 ANXIETY: ICD-10-CM

## 2022-09-09 DIAGNOSIS — I10 PRIMARY HYPERTENSION: ICD-10-CM

## 2022-09-09 RX ORDER — METOPROLOL SUCCINATE 25 MG/1
25 TABLET, EXTENDED RELEASE ORAL DAILY
Qty: 90 TABLET | Refills: 1 | Status: SHIPPED | OUTPATIENT
Start: 2022-09-09

## 2022-09-09 RX ORDER — ESCITALOPRAM OXALATE 5 MG/1
5 TABLET ORAL DAILY
Qty: 90 TABLET | Refills: 1 | Status: SHIPPED | OUTPATIENT
Start: 2022-09-09

## 2023-04-13 ENCOUNTER — ANESTHESIA EVENT (OUTPATIENT)
Dept: SURGERY | Facility: HOSPITAL | Age: 33
End: 2023-04-13
Payer: COMMERCIAL

## 2023-04-13 ENCOUNTER — APPOINTMENT (OUTPATIENT)
Dept: CT IMAGING | Facility: HOSPITAL | Age: 33
End: 2023-04-13
Payer: COMMERCIAL

## 2023-04-13 ENCOUNTER — ANESTHESIA (OUTPATIENT)
Dept: PERIOP | Facility: HOSPITAL | Age: 33
End: 2023-04-13
Payer: COMMERCIAL

## 2023-04-13 ENCOUNTER — ANESTHESIA (OUTPATIENT)
Dept: SURGERY | Facility: HOSPITAL | Age: 33
End: 2023-04-13
Payer: COMMERCIAL

## 2023-04-13 ENCOUNTER — HOSPITAL ENCOUNTER (OUTPATIENT)
Facility: HOSPITAL | Age: 33
Discharge: HOME | End: 2023-04-14
Attending: EMERGENCY MEDICINE | Admitting: SURGERY
Payer: COMMERCIAL

## 2023-04-13 ENCOUNTER — ANESTHESIA EVENT (OUTPATIENT)
Dept: PERIOP | Facility: HOSPITAL | Age: 33
End: 2023-04-13
Payer: COMMERCIAL

## 2023-04-13 DIAGNOSIS — R10.31 RLQ ABDOMINAL PAIN: Primary | ICD-10-CM

## 2023-04-13 PROBLEM — K35.80 ACUTE APPENDICITIS: Status: ACTIVE | Noted: 2023-04-13

## 2023-04-13 LAB
ALBUMIN SERPL-MCNC: 4.4 G/DL (ref 3.5–5.2)
ALBUMIN/GLOB SERPL: 1.3 G/DL
ALP SERPL-CCNC: 140 U/L (ref 39–117)
ALT SERPL W P-5'-P-CCNC: 10 U/L (ref 1–33)
ANION GAP SERPL CALCULATED.3IONS-SCNC: 12 MMOL/L (ref 5–15)
AST SERPL-CCNC: 25 U/L (ref 1–32)
B-HCG UR QL: NEGATIVE
BACTERIA UR QL AUTO: ABNORMAL /HPF
BASOPHILS # BLD AUTO: 0.1 10*3/MM3 (ref 0–0.2)
BASOPHILS NFR BLD AUTO: 1.1 % (ref 0–1.5)
BILIRUB SERPL-MCNC: 0.9 MG/DL (ref 0–1.2)
BILIRUB UR QL STRIP: ABNORMAL
BUN SERPL-MCNC: 10 MG/DL (ref 6–20)
BUN/CREAT SERPL: 10.5 (ref 7–25)
CALCIUM SPEC-SCNC: 9.7 MG/DL (ref 8.6–10.5)
CHLORIDE SERPL-SCNC: 103 MMOL/L (ref 98–107)
CLARITY UR: CLEAR
CO2 SERPL-SCNC: 25 MMOL/L (ref 22–29)
COLOR UR: ABNORMAL
CREAT SERPL-MCNC: 0.95 MG/DL (ref 0.57–1)
D-LACTATE SERPL-SCNC: 1 MMOL/L (ref 0.5–2)
DEPRECATED RDW RBC AUTO: 41.6 FL (ref 37–54)
EGFRCR SERPLBLD CKD-EPI 2021: 81.3 ML/MIN/1.73
EOSINOPHIL # BLD AUTO: 0.1 10*3/MM3 (ref 0–0.4)
EOSINOPHIL NFR BLD AUTO: 1.7 % (ref 0.3–6.2)
ERYTHROCYTE [DISTWIDTH] IN BLOOD BY AUTOMATED COUNT: 13.7 % (ref 12.3–15.4)
GLOBULIN UR ELPH-MCNC: 3.4 GM/DL
GLUCOSE SERPL-MCNC: 86 MG/DL (ref 65–99)
GLUCOSE UR STRIP-MCNC: NEGATIVE MG/DL
HCT VFR BLD AUTO: 42.5 % (ref 34–46.6)
HGB BLD-MCNC: 13.6 G/DL (ref 12–15.9)
HGB UR QL STRIP.AUTO: NEGATIVE
KETONES UR QL STRIP: ABNORMAL
LEUKOCYTE ESTERASE UR QL STRIP.AUTO: ABNORMAL
LYMPHOCYTES # BLD AUTO: 1.9 10*3/MM3 (ref 0.7–3.1)
LYMPHOCYTES NFR BLD AUTO: 24.4 % (ref 19.6–45.3)
MCH RBC QN AUTO: 27.9 PG (ref 26.6–33)
MCHC RBC AUTO-ENTMCNC: 32.1 G/DL (ref 31.5–35.7)
MCV RBC AUTO: 86.9 FL (ref 79–97)
MONOCYTES # BLD AUTO: 0.6 10*3/MM3 (ref 0.1–0.9)
MONOCYTES NFR BLD AUTO: 7.5 % (ref 5–12)
MUCOUS THREADS URNS QL MICRO: ABNORMAL /HPF
NEUTROPHILS NFR BLD AUTO: 5.2 10*3/MM3 (ref 1.7–7)
NEUTROPHILS NFR BLD AUTO: 65.3 % (ref 42.7–76)
NITRITE UR QL STRIP: POSITIVE
NRBC BLD AUTO-RTO: 0 /100 WBC (ref 0–0.2)
PH UR STRIP.AUTO: 5.5 [PH] (ref 5–8)
PLATELET # BLD AUTO: 273 10*3/MM3 (ref 140–450)
PMV BLD AUTO: 9.8 FL (ref 6–12)
POTASSIUM SERPL-SCNC: 3.7 MMOL/L (ref 3.5–5.2)
PROT SERPL-MCNC: 7.8 G/DL (ref 6–8.5)
PROT UR QL STRIP: ABNORMAL
QT INTERVAL: 432 MS
RBC # BLD AUTO: 4.89 10*6/MM3 (ref 3.77–5.28)
RBC # UR STRIP: ABNORMAL /HPF
REF LAB TEST METHOD: ABNORMAL
SODIUM SERPL-SCNC: 140 MMOL/L (ref 136–145)
SP GR UR STRIP: 1.04 (ref 1–1.03)
SQUAMOUS #/AREA URNS HPF: ABNORMAL /HPF
UROBILINOGEN UR QL STRIP: ABNORMAL
WBC # UR STRIP: ABNORMAL /HPF
WBC NRBC COR # BLD: 8 10*3/MM3 (ref 3.4–10.8)

## 2023-04-13 PROCEDURE — 44970 LAPAROSCOPY APPENDECTOMY: CPT

## 2023-04-13 PROCEDURE — G0378 HOSPITAL OBSERVATION PER HR: HCPCS

## 2023-04-13 PROCEDURE — 81025 URINE PREGNANCY TEST: CPT | Performed by: NURSE PRACTITIONER

## 2023-04-13 PROCEDURE — 83605 ASSAY OF LACTIC ACID: CPT | Performed by: NURSE PRACTITIONER

## 2023-04-13 PROCEDURE — 74177 CT ABD & PELVIS W/CONTRAST: CPT

## 2023-04-13 PROCEDURE — 93005 ELECTROCARDIOGRAM TRACING: CPT | Performed by: NURSE PRACTITIONER

## 2023-04-13 PROCEDURE — 25010000002 CEFOXITIN PER 1 G: Performed by: SURGERY

## 2023-04-13 PROCEDURE — 85025 COMPLETE CBC W/AUTO DIFF WBC: CPT | Performed by: NURSE PRACTITIONER

## 2023-04-13 PROCEDURE — 25010000002 KETOROLAC TROMETHAMINE PER 15 MG: Performed by: NURSE ANESTHETIST, CERTIFIED REGISTERED

## 2023-04-13 PROCEDURE — 88304 TISSUE EXAM BY PATHOLOGIST: CPT | Performed by: SURGERY

## 2023-04-13 PROCEDURE — 44970 LAPAROSCOPY APPENDECTOMY: CPT | Performed by: SURGERY

## 2023-04-13 PROCEDURE — 25010000002 HYDROMORPHONE PER 4 MG: Performed by: NURSE ANESTHETIST, CERTIFIED REGISTERED

## 2023-04-13 PROCEDURE — 25010000002 ONDANSETRON PER 1 MG: Performed by: NURSE ANESTHETIST, CERTIFIED REGISTERED

## 2023-04-13 PROCEDURE — 99284 EMERGENCY DEPT VISIT MOD MDM: CPT

## 2023-04-13 PROCEDURE — 25010000002 CEFTRIAXONE PER 250 MG: Performed by: NURSE PRACTITIONER

## 2023-04-13 PROCEDURE — 25010000002 MIDAZOLAM PER 1 MG: Performed by: NURSE ANESTHETIST, CERTIFIED REGISTERED

## 2023-04-13 PROCEDURE — 25010000002 PROPOFOL 200 MG/20ML EMULSION: Performed by: NURSE ANESTHETIST, CERTIFIED REGISTERED

## 2023-04-13 PROCEDURE — 25510000001 IOPAMIDOL PER 1 ML: Performed by: EMERGENCY MEDICINE

## 2023-04-13 PROCEDURE — 81001 URINALYSIS AUTO W/SCOPE: CPT | Performed by: NURSE PRACTITIONER

## 2023-04-13 PROCEDURE — 25010000002 DEXAMETHASONE PER 1 MG: Performed by: NURSE ANESTHETIST, CERTIFIED REGISTERED

## 2023-04-13 PROCEDURE — 80053 COMPREHEN METABOLIC PANEL: CPT | Performed by: NURSE PRACTITIONER

## 2023-04-13 PROCEDURE — 25010000002 FENTANYL CITRATE (PF) 100 MCG/2ML SOLUTION: Performed by: NURSE ANESTHETIST, CERTIFIED REGISTERED

## 2023-04-13 PROCEDURE — 99222 1ST HOSP IP/OBS MODERATE 55: CPT | Performed by: SURGERY

## 2023-04-13 DEVICE — ENDOPATH ETS45 2.5MM RELOADS (VASCULAR/THIN)
Type: IMPLANTABLE DEVICE | Site: ABDOMEN | Status: FUNCTIONAL
Brand: ENDOPATH

## 2023-04-13 RX ORDER — MIDAZOLAM HYDROCHLORIDE 1 MG/ML
INJECTION INTRAMUSCULAR; INTRAVENOUS AS NEEDED
Status: DISCONTINUED | OUTPATIENT
Start: 2023-04-13 | End: 2023-04-13 | Stop reason: SURG

## 2023-04-13 RX ORDER — MEPERIDINE HYDROCHLORIDE 25 MG/ML
12.5 INJECTION INTRAMUSCULAR; INTRAVENOUS; SUBCUTANEOUS
Status: DISCONTINUED | OUTPATIENT
Start: 2023-04-13 | End: 2023-04-13 | Stop reason: HOSPADM

## 2023-04-13 RX ORDER — PROPOFOL 10 MG/ML
INJECTION, EMULSION INTRAVENOUS AS NEEDED
Status: DISCONTINUED | OUTPATIENT
Start: 2023-04-13 | End: 2023-04-13 | Stop reason: SURG

## 2023-04-13 RX ORDER — FAMOTIDINE 20 MG/1
20 TABLET, FILM COATED ORAL 2 TIMES DAILY
Status: DISCONTINUED | OUTPATIENT
Start: 2023-04-13 | End: 2023-04-14 | Stop reason: HOSPADM

## 2023-04-13 RX ORDER — ESCITALOPRAM OXALATE 5 MG/1
5 TABLET ORAL DAILY
Status: DISCONTINUED | OUTPATIENT
Start: 2023-04-14 | End: 2023-04-14 | Stop reason: HOSPADM

## 2023-04-13 RX ORDER — ALBUTEROL SULFATE 2.5 MG/3ML
2.5 SOLUTION RESPIRATORY (INHALATION) ONCE AS NEEDED
Status: DISCONTINUED | OUTPATIENT
Start: 2023-04-13 | End: 2023-04-13 | Stop reason: HOSPADM

## 2023-04-13 RX ORDER — SODIUM CHLORIDE 9 MG/ML
125 INJECTION, SOLUTION INTRAVENOUS CONTINUOUS
Status: DISCONTINUED | OUTPATIENT
Start: 2023-04-13 | End: 2023-04-14 | Stop reason: HOSPADM

## 2023-04-13 RX ORDER — ACETAMINOPHEN 650 MG/1
650 SUPPOSITORY RECTAL EVERY 4 HOURS PRN
Status: DISCONTINUED | OUTPATIENT
Start: 2023-04-13 | End: 2023-04-14 | Stop reason: HOSPADM

## 2023-04-13 RX ORDER — BUPIVACAINE HYDROCHLORIDE 5 MG/ML
INJECTION, SOLUTION PERINEURAL AS NEEDED
Status: DISCONTINUED | OUTPATIENT
Start: 2023-04-13 | End: 2023-04-13 | Stop reason: HOSPADM

## 2023-04-13 RX ORDER — NALOXONE HCL 0.4 MG/ML
0.1 VIAL (ML) INJECTION
Status: DISCONTINUED | OUTPATIENT
Start: 2023-04-13 | End: 2023-04-14 | Stop reason: HOSPADM

## 2023-04-13 RX ORDER — ONDANSETRON 2 MG/ML
INJECTION INTRAMUSCULAR; INTRAVENOUS AS NEEDED
Status: DISCONTINUED | OUTPATIENT
Start: 2023-04-13 | End: 2023-04-13 | Stop reason: SURG

## 2023-04-13 RX ORDER — ONDANSETRON 2 MG/ML
4 INJECTION INTRAMUSCULAR; INTRAVENOUS EVERY 6 HOURS PRN
Status: DISCONTINUED | OUTPATIENT
Start: 2023-04-13 | End: 2023-04-14 | Stop reason: HOSPADM

## 2023-04-13 RX ORDER — ROCURONIUM BROMIDE 10 MG/ML
INJECTION, SOLUTION INTRAVENOUS AS NEEDED
Status: DISCONTINUED | OUTPATIENT
Start: 2023-04-13 | End: 2023-04-13 | Stop reason: SURG

## 2023-04-13 RX ORDER — HYDROMORPHONE HCL 110MG/55ML
PATIENT CONTROLLED ANALGESIA SYRINGE INTRAVENOUS AS NEEDED
Status: DISCONTINUED | OUTPATIENT
Start: 2023-04-13 | End: 2023-04-13 | Stop reason: SURG

## 2023-04-13 RX ORDER — FENTANYL CITRATE 50 UG/ML
50 INJECTION, SOLUTION INTRAMUSCULAR; INTRAVENOUS
Status: DISCONTINUED | OUTPATIENT
Start: 2023-04-13 | End: 2023-04-13 | Stop reason: HOSPADM

## 2023-04-13 RX ORDER — HYDROCODONE BITARTRATE AND ACETAMINOPHEN 5; 325 MG/1; MG/1
1 TABLET ORAL EVERY 4 HOURS PRN
Status: DISCONTINUED | OUTPATIENT
Start: 2023-04-13 | End: 2023-04-14 | Stop reason: HOSPADM

## 2023-04-13 RX ORDER — METRONIDAZOLE 500 MG/100ML
500 INJECTION, SOLUTION INTRAVENOUS ONCE
Status: COMPLETED | OUTPATIENT
Start: 2023-04-13 | End: 2023-04-13

## 2023-04-13 RX ORDER — LABETALOL HYDROCHLORIDE 5 MG/ML
5 INJECTION, SOLUTION INTRAVENOUS
Status: DISCONTINUED | OUTPATIENT
Start: 2023-04-13 | End: 2023-04-13 | Stop reason: HOSPADM

## 2023-04-13 RX ORDER — METOPROLOL SUCCINATE 25 MG/1
25 TABLET, EXTENDED RELEASE ORAL DAILY
Status: DISCONTINUED | OUTPATIENT
Start: 2023-04-14 | End: 2023-04-14 | Stop reason: HOSPADM

## 2023-04-13 RX ORDER — NALOXONE HCL 0.4 MG/ML
0.4 VIAL (ML) INJECTION AS NEEDED
Status: DISCONTINUED | OUTPATIENT
Start: 2023-04-13 | End: 2023-04-13 | Stop reason: HOSPADM

## 2023-04-13 RX ORDER — PROMETHAZINE HYDROCHLORIDE 25 MG/1
25 TABLET ORAL ONCE AS NEEDED
Status: DISCONTINUED | OUTPATIENT
Start: 2023-04-13 | End: 2023-04-13 | Stop reason: HOSPADM

## 2023-04-13 RX ORDER — DROPERIDOL 2.5 MG/ML
0.62 INJECTION, SOLUTION INTRAMUSCULAR; INTRAVENOUS ONCE
Status: DISCONTINUED | OUTPATIENT
Start: 2023-04-13 | End: 2023-04-13 | Stop reason: HOSPADM

## 2023-04-13 RX ORDER — DIPHENHYDRAMINE HYDROCHLORIDE 50 MG/ML
12.5 INJECTION INTRAMUSCULAR; INTRAVENOUS ONCE AS NEEDED
Status: DISCONTINUED | OUTPATIENT
Start: 2023-04-13 | End: 2023-04-13 | Stop reason: HOSPADM

## 2023-04-13 RX ORDER — KETOROLAC TROMETHAMINE 30 MG/ML
INJECTION, SOLUTION INTRAMUSCULAR; INTRAVENOUS AS NEEDED
Status: DISCONTINUED | OUTPATIENT
Start: 2023-04-13 | End: 2023-04-13 | Stop reason: SURG

## 2023-04-13 RX ORDER — HYDRALAZINE HYDROCHLORIDE 20 MG/ML
5 INJECTION INTRAMUSCULAR; INTRAVENOUS
Status: DISCONTINUED | OUTPATIENT
Start: 2023-04-13 | End: 2023-04-13 | Stop reason: HOSPADM

## 2023-04-13 RX ORDER — FLUMAZENIL 0.1 MG/ML
0.2 INJECTION INTRAVENOUS AS NEEDED
Status: DISCONTINUED | OUTPATIENT
Start: 2023-04-13 | End: 2023-04-13 | Stop reason: HOSPADM

## 2023-04-13 RX ORDER — LIDOCAINE HYDROCHLORIDE 20 MG/ML
INJECTION, SOLUTION EPIDURAL; INFILTRATION; INTRACAUDAL; PERINEURAL AS NEEDED
Status: DISCONTINUED | OUTPATIENT
Start: 2023-04-13 | End: 2023-04-13 | Stop reason: SURG

## 2023-04-13 RX ORDER — FENTANYL CITRATE 50 UG/ML
INJECTION, SOLUTION INTRAMUSCULAR; INTRAVENOUS AS NEEDED
Status: DISCONTINUED | OUTPATIENT
Start: 2023-04-13 | End: 2023-04-13 | Stop reason: SURG

## 2023-04-13 RX ORDER — DIPHENHYDRAMINE HYDROCHLORIDE 50 MG/ML
12.5 INJECTION INTRAMUSCULAR; INTRAVENOUS
Status: DISCONTINUED | OUTPATIENT
Start: 2023-04-13 | End: 2023-04-13 | Stop reason: HOSPADM

## 2023-04-13 RX ORDER — SODIUM CHLORIDE, SODIUM LACTATE, POTASSIUM CHLORIDE, CALCIUM CHLORIDE 600; 310; 30; 20 MG/100ML; MG/100ML; MG/100ML; MG/100ML
INJECTION, SOLUTION INTRAVENOUS CONTINUOUS PRN
Status: DISCONTINUED | OUTPATIENT
Start: 2023-04-13 | End: 2023-04-13 | Stop reason: SURG

## 2023-04-13 RX ORDER — PHENYLEPHRINE HCL IN 0.9% NACL 1 MG/10 ML
SYRINGE (ML) INTRAVENOUS AS NEEDED
Status: DISCONTINUED | OUTPATIENT
Start: 2023-04-13 | End: 2023-04-13 | Stop reason: SURG

## 2023-04-13 RX ORDER — GLYCOPYRROLATE 0.2 MG/ML
INJECTION INTRAMUSCULAR; INTRAVENOUS AS NEEDED
Status: DISCONTINUED | OUTPATIENT
Start: 2023-04-13 | End: 2023-04-13 | Stop reason: SURG

## 2023-04-13 RX ORDER — PROMETHAZINE HYDROCHLORIDE 25 MG/1
25 SUPPOSITORY RECTAL ONCE AS NEEDED
Status: DISCONTINUED | OUTPATIENT
Start: 2023-04-13 | End: 2023-04-13 | Stop reason: HOSPADM

## 2023-04-13 RX ORDER — DEXAMETHASONE SODIUM PHOSPHATE 4 MG/ML
INJECTION, SOLUTION INTRA-ARTICULAR; INTRALESIONAL; INTRAMUSCULAR; INTRAVENOUS; SOFT TISSUE AS NEEDED
Status: DISCONTINUED | OUTPATIENT
Start: 2023-04-13 | End: 2023-04-13 | Stop reason: SURG

## 2023-04-13 RX ORDER — ONDANSETRON 2 MG/ML
4 INJECTION INTRAMUSCULAR; INTRAVENOUS ONCE AS NEEDED
Status: DISCONTINUED | OUTPATIENT
Start: 2023-04-13 | End: 2023-04-13 | Stop reason: HOSPADM

## 2023-04-13 RX ORDER — EPHEDRINE SULFATE 5 MG/ML
5 INJECTION INTRAVENOUS ONCE AS NEEDED
Status: DISCONTINUED | OUTPATIENT
Start: 2023-04-13 | End: 2023-04-13 | Stop reason: HOSPADM

## 2023-04-13 RX ORDER — ACETAMINOPHEN 325 MG/1
650 TABLET ORAL EVERY 4 HOURS PRN
Status: DISCONTINUED | OUTPATIENT
Start: 2023-04-13 | End: 2023-04-14 | Stop reason: HOSPADM

## 2023-04-13 RX ORDER — ONDANSETRON 4 MG/1
4 TABLET, FILM COATED ORAL EVERY 6 HOURS PRN
Status: DISCONTINUED | OUTPATIENT
Start: 2023-04-13 | End: 2023-04-14 | Stop reason: HOSPADM

## 2023-04-13 RX ORDER — NEOSTIGMINE METHYLSULFATE 5 MG/5 ML
SYRINGE (ML) INTRAVENOUS AS NEEDED
Status: DISCONTINUED | OUTPATIENT
Start: 2023-04-13 | End: 2023-04-13 | Stop reason: SURG

## 2023-04-13 RX ORDER — PROCHLORPERAZINE EDISYLATE 5 MG/ML
10 INJECTION INTRAMUSCULAR; INTRAVENOUS ONCE AS NEEDED
Status: DISCONTINUED | OUTPATIENT
Start: 2023-04-13 | End: 2023-04-13 | Stop reason: HOSPADM

## 2023-04-13 RX ADMIN — MIDAZOLAM 2 MG: 1 INJECTION INTRAMUSCULAR; INTRAVENOUS at 16:58

## 2023-04-13 RX ADMIN — Medication 3 MG: at 17:43

## 2023-04-13 RX ADMIN — CEFTRIAXONE 2 G: 2 INJECTION, POWDER, FOR SOLUTION INTRAMUSCULAR; INTRAVENOUS at 15:49

## 2023-04-13 RX ADMIN — Medication 200 MCG: at 17:08

## 2023-04-13 RX ADMIN — CEFOXITIN SODIUM 2 G: 2 POWDER, FOR SOLUTION INTRAVENOUS at 23:54

## 2023-04-13 RX ADMIN — LIDOCAINE HYDROCHLORIDE 100 MG: 20 INJECTION, SOLUTION EPIDURAL; INFILTRATION; INTRACAUDAL; PERINEURAL at 17:02

## 2023-04-13 RX ADMIN — KETOROLAC TROMETHAMINE 30 MG: 30 INJECTION, SOLUTION INTRAMUSCULAR at 17:40

## 2023-04-13 RX ADMIN — HYDROMORPHONE HYDROCHLORIDE 0.5 MG: 2 INJECTION, SOLUTION INTRAMUSCULAR; INTRAVENOUS; SUBCUTANEOUS at 17:53

## 2023-04-13 RX ADMIN — SODIUM CHLORIDE 125 ML/HR: 9 INJECTION, SOLUTION INTRAVENOUS at 21:45

## 2023-04-13 RX ADMIN — Medication 200 MCG: at 17:16

## 2023-04-13 RX ADMIN — PROPOFOL 200 MG: 10 INJECTION, EMULSION INTRAVENOUS at 17:02

## 2023-04-13 RX ADMIN — ROCURONIUM BROMIDE 50 MG: 10 INJECTION, SOLUTION INTRAVENOUS at 17:02

## 2023-04-13 RX ADMIN — Medication 200 MCG: at 17:23

## 2023-04-13 RX ADMIN — IOPAMIDOL 100 ML: 755 INJECTION, SOLUTION INTRAVENOUS at 14:43

## 2023-04-13 RX ADMIN — HYDROMORPHONE HYDROCHLORIDE 0.5 MG: 2 INJECTION, SOLUTION INTRAMUSCULAR; INTRAVENOUS; SUBCUTANEOUS at 17:31

## 2023-04-13 RX ADMIN — Medication 200 MCG: at 17:02

## 2023-04-13 RX ADMIN — GLYCOPYRROLATE 0.4 MCG: 0.2 INJECTION INTRAMUSCULAR; INTRAVENOUS at 17:20

## 2023-04-13 RX ADMIN — SODIUM CHLORIDE, SODIUM LACTATE, POTASSIUM CHLORIDE, AND CALCIUM CHLORIDE: .6; .31; .03; .02 INJECTION, SOLUTION INTRAVENOUS at 16:56

## 2023-04-13 RX ADMIN — FENTANYL CITRATE 100 MCG: 50 INJECTION, SOLUTION INTRAMUSCULAR; INTRAVENOUS at 16:58

## 2023-04-13 RX ADMIN — DEXAMETHASONE SODIUM PHOSPHATE 8 MG: 4 INJECTION, SOLUTION INTRAMUSCULAR; INTRAVENOUS at 17:17

## 2023-04-13 RX ADMIN — FAMOTIDINE 20 MG: 20 TABLET, FILM COATED ORAL at 21:44

## 2023-04-13 RX ADMIN — ONDANSETRON 4 MG: 2 INJECTION INTRAMUSCULAR; INTRAVENOUS at 17:40

## 2023-04-13 RX ADMIN — METRONIDAZOLE 500 MG: 500 INJECTION, SOLUTION INTRAVENOUS at 15:49

## 2023-04-13 RX ADMIN — GLYCOPYRROLATE 0.4 MCG: 0.2 INJECTION INTRAMUSCULAR; INTRAVENOUS at 17:43

## 2023-04-13 NOTE — ANESTHESIA PROCEDURE NOTES
Airway  Urgency: elective    Date/Time: 4/13/2023 5:03 PM  Airway not difficult    General Information and Staff    Patient location during procedure: OR  Anesthesiologist: Orion Tompkins MD  CRNA/CAA: Miroslava Covarrubias CRNA    Indications and Patient Condition  Indications for airway management: airway protection    Preoxygenated: yes  MILS maintained throughout  Mask difficulty assessment: 1 - vent by mask    Final Airway Details  Final airway type: endotracheal airway      Successful airway: ETT  Cuffed: yes   Successful intubation technique: video laryngoscopy  Facilitating devices/methods: intubating stylet  Endotracheal tube insertion site: oral  Blade: Montilla  Blade size: 3  ETT size (mm): 7.0  Cormack-Lehane Classification: grade I - full view of glottis  Placement verified by: chest auscultation and capnometry   Cuff volume (mL): 7  Number of attempts at approach: 1  Assessment: lips, teeth, and gum same as pre-op and atraumatic intubation

## 2023-04-13 NOTE — ANESTHESIA PREPROCEDURE EVALUATION
Anesthesia Evaluation     Patient summary reviewed and Nursing notes reviewed   no history of anesthetic complications:  NPO Solid Status: > 8 hours  NPO Liquid Status: > 8 hours           Airway   Mallampati: II  TM distance: >3 FB  Neck ROM: full  No difficulty expected  Dental      Pulmonary - negative pulmonary ROS and normal exam   Cardiovascular - normal exam    (+) hypertension, hyperlipidemia,       Neuro/Psych  (+) psychiatric history Anxiety,    GI/Hepatic/Renal/Endo    (+) obesity,   thyroid problem hypothyroidism    Musculoskeletal (-) negative ROS    Abdominal  - normal exam   Substance History - negative use     OB/GYN negative ob/gyn ROS         Other                        Anesthesia Plan    ASA 3 - emergent     general     intravenous induction     Anesthetic plan, risks, benefits, and alternatives have been provided, discussed and informed consent has been obtained with: patient.    Plan discussed with CRNA.        CODE STATUS:

## 2023-04-13 NOTE — H&P
CHIEF COMPLAINT:    Right lower quadrant abdominal pain, nausea vomiting    HISTORY OF PRESENT ILLNESS:    Lorena Bryson is a 32 y.o. female who is accompanied in the emergency department today by her mother.  She states that she woke at 3 AM yesterday with abdominal pain and vomiting.  This persisted throughout the day and prompted her to come to the emergency department.  She states the pain began as a count of a generalized pain throughout the abdomen and is now localized in the right lower quadrant.  She has had vomiting and nausea associated with the pain.  She and her mother became concerned as her father has previously had a perforated appendix with a complicated surgical and postoperative course apparently.    In the emergency department the patient was worked up with labs as well as imaging.  CT of the abdomen pelvis showed uncomplicated acute appendicitis.  Her labs were essentially unremarkable.    Per she and her mother she does have a history of some cardiac arrhythmia.  She notes that in December her fallopian tubes were surgically removed to prevent any future pregnancies.    Past Medical History:   Diagnosis Date   • Allergic    • Arrhythmia    • Hyperlipidemia    • Hypertension    • Hypothyroidism    • Morbid obesity    • Vitamin D deficiency 09/06/2017       No past surgical history on file.    Prior to Admission medications    Medication Sig Start Date End Date Taking? Authorizing Provider   amoxicillin-clavulanate (AUGMENTIN) 875-125 MG per tablet Take 1 tablet by mouth every 12 hours for 7 days 10/5/22      benzonatate (TESSALON) 200 MG capsule Take 1 capsule by mouth 3 (Three) Times a Day As Needed for Cough. 12/22/21   Eden Rojas MD   cyclobenzaprine (FLEXERIL) 5 MG tablet Take 5-10 mg by mouth. 12/22/21   ProviderJoseph MD   cyclobenzaprine (FLEXERIL) 5 MG tablet Take 1-2 tablets by mouth 2 (two) times daily as needed for Muscle spasms. 9/27/22      escitalopram (Lexapro) 5  MG tablet Take 1 tablet by mouth Daily. 9/9/22   Eden Rojas MD   metoprolol succinate XL (TOPROL-XL) 25 MG 24 hr tablet Take 1 tablet by mouth Daily. 9/9/22   Eden Rojas MD   Multiple Vitamins-Minerals (MULTIVITAMIN ADULT, MINERALS, PO)     ProviderJoseph MD   norethindrone (MICRONOR) 0.35 MG tablet Take 1 tablet by mouth daily. 4/29/22      norethindrone (MICRONOR) 0.35 MG tablet Take 1 tablet by mouth daily. 8/11/22      predniSONE (DELTASONE) 10 MG tablet Take 1 tablet by mouth 2 (Two) Times a Day. 12/29/21   Eden Rojas MD   vitamin D (ERGOCALCIFEROL) 1.25 MG (52664 UT) capsule capsule Take 1 capsule by mouth 1 (One) Time Per Week. 8/13/21   Eden Rojas MD       No Known Allergies    Family History   Problem Relation Age of Onset   • Multiple sclerosis Mother    • Endometriosis Mother    • Rheum arthritis Mother    • Arthritis Mother    • Other Mother         Multiple sclerosis   • Hypertension Father    • Hyperlipidemia Father    • Heart disease Father         heart aneurysm   • Sleep apnea Father    • Asthma Father    • Rheum arthritis Maternal Grandmother    • Autoimmune disease Maternal Grandmother    • Lupus Maternal Grandmother    • Irritable bowel syndrome Maternal Grandmother    • Arthritis Maternal Grandmother    • Asthma Maternal Grandmother    • Hypertension Paternal Grandmother    • Hypertension Paternal Grandfather    • Heart disease Paternal Grandfather    • Sleep apnea Paternal Grandfather    • Hyperlipidemia Paternal Grandfather    • Diabetes Maternal Grandfather        Social History     Socioeconomic History   • Marital status:    Tobacco Use   • Smoking status: Never   • Smokeless tobacco: Never   Vaping Use   • Vaping Use: Never used   Substance and Sexual Activity   • Alcohol use: No     Comment: caffeine <1c qd   • Drug use: No   • Sexual activity: Yes     Partners: Male     Birth control/protection: None     Comment:        Review of  "Systems   Constitutional: Positive for appetite change.   Gastrointestinal: Positive for abdominal pain, nausea and vomiting.       Objective     /91   Pulse 71   Temp 98.1 °F (36.7 °C) (Oral)   Resp 18   Ht 160 cm (63\")   Wt 101 kg (221 lb 9 oz)   LMP 03/27/2023   SpO2 93%   BMI 39.25 kg/m²     Physical Exam  Constitutional:       General: She is not in acute distress.     Appearance: Normal appearance. She is obese. She is not ill-appearing, toxic-appearing or diaphoretic.   HENT:      Head: Normocephalic and atraumatic.   Eyes:      General:         Right eye: No discharge.         Left eye: No discharge.      Extraocular Movements: Extraocular movements intact.      Conjunctiva/sclera: Conjunctivae normal.   Pulmonary:      Effort: Pulmonary effort is normal. No respiratory distress.   Abdominal:      Palpations: Abdomen is soft. There is no mass.      Tenderness: There is abdominal tenderness.      Hernia: No hernia is present.   Skin:     General: Skin is warm.      Coloration: Skin is not jaundiced.   Neurological:      General: No focal deficit present.      Mental Status: She is alert and oriented to person, place, and time.   Psychiatric:         Mood and Affect: Mood normal.         Behavior: Behavior normal.         Thought Content: Thought content normal.         Judgment: Judgment normal.         DIAGNOSTIC DATA:    CT images and report reviewed showing acute uncomplicated appendicitis    ASSESSMENT:    Acute appendicitis without evident complication    PLAN:    I discussed the pathophysiology of appendicitis with the patient and her mother.  They would like pursue surgical treatment.  The risks and benefits of laparoscopic appendectomy, possible open operation were discussed with them.  We will plan for surgery later today.  Discussed with ER provider Patricia who will coordinate getting antibiotics for the patient.  Anticipate she will stay overnight and likely should be suitable for " discharge tomorrow.          This document has been electronically signed by Cesar Flores MD on April 13, 2023 15:35 EDT

## 2023-04-13 NOTE — OP NOTE
Operative Note    Lorena Bryson  4/13/2023    Pre-op Diagnosis:   RLQ abdominal pain [R10.31], acute appendectomy    Post-op Diagnosis:     Post-Op Diagnosis Codes:     * RLQ abdominal pain [R10.31] , acute appendectomy    Procedure/CPT® Codes:      Procedure(s):  APPENDECTOMY LAPAROSCOPIC    Surgeon(s):  Cesar Flores MD    Anesthesia: General    Staff:   Circulator: Katelyn Mo RN  Scrub Person: Durga Blanco; Barrett Castillo RN  Assistant: Conrad Ngo CSFA    Estimated Blood Loss: minimal    Specimens:                ID Type Source Tests Collected by Time   A :  Tissue Large Intestine, Appendix TISSUE PATHOLOGY EXAM Cesar Flores MD 4/13/2023 8097         Drains:   [REMOVED] Urethral Catheter Non-latex 16 Fr. (Removed)       Findings: acute appendicitis, non-ruptured    Complications: none    Indication: Acute Appendicitis    Operative Note:    Patient was seen and consented preoperatively.  Following this she was taken to the operating room and placed in supine position on the OR table.  General anesthetic was administered and the patient was orotracheally intubated without incident.  Chapman catheter was placed sterilely by nursing.  A preoperative briefing was performed.  The abdomen was prepped and draped.  A timeout was then performed.    Following time out local anesthetic was injected below the patient's umbilicus.  A curvilinear incision was then made below the umbilicus and sharply carried down to the junction of the umbilical stalk with the abdominal wall fascia.  The umbilical stalk was then partially divided revealing a small fascial defect which was entered bluntly.  A 5 mm port was then placed through this defect and used to insufflate the abdomen without issue.  The area below trocar insertion was inspected and found to be without injury.     The patient was placed in moderate Trendelenburg position.  In the low midline of the abdomen additional  local anesthetic was injected and a 5 mm port was placed in this position.  The camera was then moved to this port to allow for upsizing of the umbilical port to a 12 mm port.  This 5 mm port was then placed in the left lower quadrant after injection of local and under direct visualization as well.    The camera was then moved to the left lower quadrant port.  Graspers were introduced and used to roll the terminal ileum away from the pelvic sidewall which then revealed an acutely inflamed nonruptured appendix.  The appendix and cecum appeared to be fairly adherent to the pelvic and abdominal sidewall.  The mid body of the appendix was grasped and elevated upward.  A LigaSure device was then used to divide the mesoappendix which allowed the appendix to be elevated further upward.  The very distal aspect of the cecum was also mobilized slightly to allow for full visualization of the juncture of the appendix with the cecum.  The stapler was then placed across the base of the appendix and used to divide it away from the cecum.  The appendix was then placed into a bag and retrieved through the umbilical port site.    The operative bed was then inspected and found to be hemostatic.  A suture passer and a 0 Vicryl suture were used to close the umbilical fascial defect. The abdomen was then completely desufflated.  The remaining 5 mm ports were removed.  The skin was closed with 4-0 Vicryl at all incision sites and covered with Skin Affix.  The Chapman catheter was then removed and the patient was awakened and returned to recovery in good condition.    Assistant: Conrad Ngo CSFA was responsible for performing the following activities: Closing and Placing Dressing and their skilled assistance was necessary for the success of this case.          This document has been electronically signed by Cesar Flores MD on April 13, 2023 18:07 EDT      Cesar Flores MD     Date: 4/13/2023  Time: 18:06  EDT

## 2023-04-13 NOTE — ED PROVIDER NOTES
Subjective   History of Present Illness  This is a 32-year-old morbidly obese female with past medical history of endometriosis, hypertension, hyperlipidemia and past surgical history of bilateral salpingectomy in December 2022 who presents to the emergency department for right lower quadrant pain.  The patient states that this pain began yesterday and has gradually became worse.  She states that the pain is located diffusely in the lower abdomen/pelvis but reports increased intensity in the right lower quadrant.  She was seen by her OBGYN earlier today for follow-up appointment status post salpingectomy and had a transvaginal ultrasound performed to check for ovarian cyst.  Ultrasound was found to be negative.  Her OBGYN referred her to the emergency department for further evaluation.  The patient states that the pain is constant and describes it as a stabbing sensation.  There is no radiation of pain.  She states the pain is worse with walking and any abrupt motion/movements.  She states that nothing makes it better.  The patient reports vomiting, nausea, constipation.  She denies any fever, dysuria, hematuria, or blood in stool.        Review of Systems   Constitutional: Negative for fever.   Gastrointestinal: Positive for abdominal pain, constipation, nausea and vomiting. Negative for blood in stool and diarrhea.   Genitourinary: Negative for difficulty urinating, dysuria, flank pain and hematuria.       Past Medical History:   Diagnosis Date   • Allergic    • Arrhythmia    • Hyperlipidemia    • Hypertension    • Hypothyroidism    • Morbid obesity    • Vitamin D deficiency 09/06/2017       No Known Allergies    No past surgical history on file.    Family History   Problem Relation Age of Onset   • Multiple sclerosis Mother    • Endometriosis Mother    • Rheum arthritis Mother    • Arthritis Mother    • Other Mother         Multiple sclerosis   • Hypertension Father    • Hyperlipidemia Father    • Heart disease  Father         heart aneurysm   • Sleep apnea Father    • Asthma Father    • Rheum arthritis Maternal Grandmother    • Autoimmune disease Maternal Grandmother    • Lupus Maternal Grandmother    • Irritable bowel syndrome Maternal Grandmother    • Arthritis Maternal Grandmother    • Asthma Maternal Grandmother    • Hypertension Paternal Grandmother    • Hypertension Paternal Grandfather    • Heart disease Paternal Grandfather    • Sleep apnea Paternal Grandfather    • Hyperlipidemia Paternal Grandfather    • Diabetes Maternal Grandfather        Social History     Socioeconomic History   • Marital status:    Tobacco Use   • Smoking status: Never   • Smokeless tobacco: Never   Vaping Use   • Vaping Use: Never used   Substance and Sexual Activity   • Alcohol use: No     Comment: caffeine <1c qd   • Drug use: No   • Sexual activity: Yes     Partners: Male     Birth control/protection: None     Comment:            Objective   Physical Exam  Vital signs and triage nurse note reviewed.  Constitutional: Awake, alert; well-developed and well-nourished. No acute distress is noted.  HEENT: Normocephalic, atraumatic; pupils are PERRL with intact EOM; oropharynx is pink and moist without exudate or erythema.  No drooling or pooling of oral secretions.  Neck: Supple, full range of motion without pain; no cervical lymphadenopathy. Normal phonation.  Cardiovascular: Regular rate and rhythm, normal S1-S2.  No murmur noted.  Pulmonary: Respiratory effort regular nonlabored, breath sounds clear to auscultation all fields.  Abdomen: Soft, tender over the right lower quadrant, nondistended with normoactive bowel sounds; no rebound or guarding.  No CVAT.  Musculoskeletal: Independent range of motion of all extremities with no palpable tenderness or edema.  Neuro: Alert oriented x3, speech is clear and appropriate, GCS 15.    Skin: Flesh tone, warm, dry, intact; no erythematous or petechial rash or lesion.      Procedures            ED Course  ED Course as of 04/15/23 1634   Thu Apr 13, 2023   1438 Care turned over to NESTOR Gomez pendind CT results and disposition. [MD]   1445 Assumed care from OMI Barrios.  Awaiting CT scan results. [CT]   1516 Spoke with Dr Flores about patient case and introduced patient to doctor.  Dr Flores at bedside at this time. [CT]   1520 Consulted Dr Flores and Pharmacist, Ava about abx of choice.  Rocephin and Flagyl ordered. [CT]   1539 Dr Flores states that he wants patient to come directly to OR, from ER. [CT]      ED Course User Index  [CT] Patricia Alvares APRN  [MD] Cherrie Khan APRN      Labs Reviewed   COMPREHENSIVE METABOLIC PANEL - Abnormal; Notable for the following components:       Result Value    Alkaline Phosphatase 140 (*)     All other components within normal limits    Narrative:     GFR Normal >60  Chronic Kidney Disease <60  Kidney Failure <15     URINALYSIS W/ MICROSCOPIC IF INDICATED (NO CULTURE) - Abnormal; Notable for the following components:    Color, UA Julianna (*)     Specific Gravity, UA 1.036 (*)     Ketones, UA 15 mg/dL (1+) (*)     Bilirubin, UA Small (1+) (*)     Protein, UA 30 mg/dL (1+) (*)     Leuk Esterase, UA Trace (*)     Nitrite, UA Positive (*)     All other components within normal limits   URINALYSIS, MICROSCOPIC ONLY - Abnormal; Notable for the following components:    RBC, UA 0-2 (*)     WBC, UA 0-2 (*)     Bacteria, UA Trace (*)     Squamous Epithelial Cells, UA 3-6 (*)     Mucus, UA Small/1+ (*)     All other components within normal limits   CBC WITH AUTO DIFFERENTIAL - Normal   CBC AND DIFFERENTIAL    Narrative:     The following orders were created for panel order CBC & Differential.  Procedure                               Abnormality         Status                     ---------                               -----------         ------                     CBC Auto Differential[[id number]]        Normal              Final result                  Please view results for these tests on the individual orders.     No radiology results for the last day  Medications   iopamidol (ISOVUE-370) 76 % injection 100 mL (has no administration in time range)                                          Medical Decision Making  Patient presents today with complaints of right lower quadrant abdominal pain that started yesterday, has gotten worse.  Some nausea with vomiting.  States she thought she was constipated, took milk of magnesi with improvement in bowel movement but was continued pain.  She denies fever chills or urinary symptoms.    She had above exam and evaluation.  IV was established.  Labs and CT were obtained.  She was offered pain medication but declines at this time.    Work-up: CBC is unremarkable with a normal white blood cell count.  Metabolic panel is also grossly unremarkable.  Urinalysis is significant for 15 ketones, small bilirubin, 30 protein, trace leukocytes, positive nitrites, 0-2 RBCs, 0-2 WBCs, trace bacteria, 3-6 squamous cells.    Amount and/or Complexity of Data Reviewed  Labs: ordered.  Radiology: ordered.      Risk  Prescription drug management.          Final diagnoses:   RLQ abdominal pain       ED Disposition  ED Disposition     None          No follow-up provider specified.       Medication List      No changes were made to your prescriptions during this visit.          Cherrie Khan, APRN  04/15/23 5766

## 2023-04-14 ENCOUNTER — READMISSION MANAGEMENT (OUTPATIENT)
Dept: CALL CENTER | Facility: HOSPITAL | Age: 33
End: 2023-04-14
Payer: COMMERCIAL

## 2023-04-14 ENCOUNTER — TELEPHONE (OUTPATIENT)
Dept: SURGERY | Facility: CLINIC | Age: 33
End: 2023-04-14
Payer: COMMERCIAL

## 2023-04-14 VITALS
RESPIRATION RATE: 16 BRPM | HEART RATE: 69 BPM | HEIGHT: 63 IN | SYSTOLIC BLOOD PRESSURE: 107 MMHG | BODY MASS INDEX: 39.45 KG/M2 | DIASTOLIC BLOOD PRESSURE: 71 MMHG | WEIGHT: 222.66 LBS | OXYGEN SATURATION: 97 % | TEMPERATURE: 98.1 F

## 2023-04-14 PROBLEM — K35.80 ACUTE APPENDICITIS: Status: RESOLVED | Noted: 2023-04-13 | Resolved: 2023-04-14

## 2023-04-14 LAB
ANION GAP SERPL CALCULATED.3IONS-SCNC: 10 MMOL/L (ref 5–15)
BASOPHILS # BLD AUTO: 0 10*3/MM3 (ref 0–0.2)
BASOPHILS NFR BLD AUTO: 0.1 % (ref 0–1.5)
BUN SERPL-MCNC: 9 MG/DL (ref 6–20)
BUN/CREAT SERPL: 10.7 (ref 7–25)
CALCIUM SPEC-SCNC: 9 MG/DL (ref 8.6–10.5)
CHLORIDE SERPL-SCNC: 103 MMOL/L (ref 98–107)
CO2 SERPL-SCNC: 23 MMOL/L (ref 22–29)
CREAT SERPL-MCNC: 0.84 MG/DL (ref 0.57–1)
DEPRECATED RDW RBC AUTO: 42.9 FL (ref 37–54)
EGFRCR SERPLBLD CKD-EPI 2021: 94.2 ML/MIN/1.73
EOSINOPHIL # BLD AUTO: 0 10*3/MM3 (ref 0–0.4)
EOSINOPHIL NFR BLD AUTO: 0.1 % (ref 0.3–6.2)
ERYTHROCYTE [DISTWIDTH] IN BLOOD BY AUTOMATED COUNT: 13.7 % (ref 12.3–15.4)
GLUCOSE SERPL-MCNC: 113 MG/DL (ref 65–99)
HCT VFR BLD AUTO: 38.9 % (ref 34–46.6)
HGB BLD-MCNC: 13.1 G/DL (ref 12–15.9)
LYMPHOCYTES # BLD AUTO: 0.6 10*3/MM3 (ref 0.7–3.1)
LYMPHOCYTES NFR BLD AUTO: 7.4 % (ref 19.6–45.3)
MCH RBC QN AUTO: 28.3 PG (ref 26.6–33)
MCHC RBC AUTO-ENTMCNC: 33.6 G/DL (ref 31.5–35.7)
MCV RBC AUTO: 84.2 FL (ref 79–97)
MONOCYTES # BLD AUTO: 0.1 10*3/MM3 (ref 0.1–0.9)
MONOCYTES NFR BLD AUTO: 1.2 % (ref 5–12)
NEUTROPHILS NFR BLD AUTO: 7.4 10*3/MM3 (ref 1.7–7)
NEUTROPHILS NFR BLD AUTO: 91.2 % (ref 42.7–76)
NRBC BLD AUTO-RTO: 0.1 /100 WBC (ref 0–0.2)
PLATELET # BLD AUTO: 236 10*3/MM3 (ref 140–450)
PMV BLD AUTO: 9.8 FL (ref 6–12)
POTASSIUM SERPL-SCNC: 4.3 MMOL/L (ref 3.5–5.2)
RBC # BLD AUTO: 4.62 10*6/MM3 (ref 3.77–5.28)
SODIUM SERPL-SCNC: 136 MMOL/L (ref 136–145)
WBC NRBC COR # BLD: 8.2 10*3/MM3 (ref 3.4–10.8)

## 2023-04-14 PROCEDURE — 80048 BASIC METABOLIC PNL TOTAL CA: CPT | Performed by: SURGERY

## 2023-04-14 PROCEDURE — 99024 POSTOP FOLLOW-UP VISIT: CPT | Performed by: SURGERY

## 2023-04-14 PROCEDURE — G0378 HOSPITAL OBSERVATION PER HR: HCPCS

## 2023-04-14 PROCEDURE — 85025 COMPLETE CBC W/AUTO DIFF WBC: CPT | Performed by: SURGERY

## 2023-04-14 RX ADMIN — ACETAMINOPHEN 650 MG: 325 TABLET, FILM COATED ORAL at 04:15

## 2023-04-14 RX ADMIN — METOPROLOL SUCCINATE 25 MG: 25 TABLET, EXTENDED RELEASE ORAL at 09:05

## 2023-04-14 RX ADMIN — ESCITALOPRAM OXALATE 5 MG: 5 TABLET ORAL at 09:05

## 2023-04-14 RX ADMIN — FAMOTIDINE 20 MG: 20 TABLET, FILM COATED ORAL at 09:05

## 2023-04-14 NOTE — NURSING NOTE
Pt refused this mornings iv antibiotics stating that she didn't like how it felt, but would be willing to take something oral.

## 2023-04-14 NOTE — PLAN OF CARE
Goal Outcome Evaluation:  Plan of Care Reviewed With: patient        Progress: no change  Outcome Evaluation: Pt had a lap choley today, VSS. iv fluids, Pain treated with prn medication. Will continue to monitor.

## 2023-04-14 NOTE — CASE MANAGEMENT/SOCIAL WORK
Discharge Planning Assessment  [facility]     Patient Name: Lorena Bryson  MRN: [id number]  Today's Date: 4/14/2023    Admit Date: 4/13/2023    Plan: Home with family.  Family can transport patient at discharge.   Discharge Needs Assessment     Row Name 04/14/23 1503       Living Environment    People in Home spouse    Name(s) of People in Home joyce Butler    Current Living Arrangements home    Primary Care Provided by self    Provides Primary Care For no one    Family Caregiver if Needed spouse;parent(s)    Family Caregiver Names  Luke , mom    Quality of Family Relationships supportive;involved;helpful    Able to Return to Prior Arrangements yes       Resource/Environmental Concerns    Resource/Environmental Concerns none    Transportation Concerns none       Transition Planning    Patient/Family Anticipates Transition to home with family    Patient/Family Anticipated Services at Transition none    Transportation Anticipated family or friend will provide       Discharge Needs Assessment    Readmission Within the Last 30 Days no previous admission in last 30 days    Equipment Currently Used at Home none    Concerns to be Addressed care coordination/care conferences;discharge planning    Anticipated Changes Related to Illness none    Equipment Needed After Discharge none    Provided Post Acute Provider List? N/A    N/A Provider List Comment denies dc needs               Discharge Plan     Row Name 04/14/23 1505       Plan    Plan Home with family.  Family can transport patient at discharge.    Patient/Family in Agreement with Plan yes    Plan Comments Met with patient at bedside.  Confirmed PCP and pharmacy.  Lives at home with spouse, who is able to transport patient at discharge.   Denies dc needs at this time.              Continued Care and Services - Discharged on 4/14/2023 Admission date: 4/13/2023 - Discharge disposition: Home or Self Care   Coordination has not been started for this encounter.      Selected Continued Care - Episodes Includes continued care and service providers with selected services from the active episodes listed below    Rising Risk Care Management Episode start date: 4/14/2023   There are no active outsourced providers for this episode.               Expected Discharge Date and Time     Expected Discharge Date Expected Discharge Time    Apr 14, 2023          Demographic Summary     Row Name 04/14/23 1503       General Information    Admission Type observation    Arrived From emergency department    Referral Source admission list    Reason for Consult discharge planning    Preferred Language English       Contact Information    Permission Granted to Share Info With                Functional Status     Row Name 04/14/23 1503       Functional Status    Usual Activity Tolerance excellent    Current Activity Tolerance excellent       Functional Status, IADL    Medications independent    Meal Preparation independent    Housekeeping independent    Laundry independent    Shopping independent       Mental Status    General Appearance WDL WDL       Mental Status Summary    Recent Changes in Mental Status/Cognitive Functioning no changes               Marily Cooper RN   Met with patient in room wearing PPE: mask, face shield/goggles, gloves, gown.      Maintained distance greater than six feet and spent less than 15 minutes in the room.

## 2023-04-14 NOTE — ANESTHESIA POSTPROCEDURE EVALUATION
Patient: Lorena Brsyon    Procedure Summary     Date: 04/13/23 Room / Location: [facility] OR 09 / [facility]IN OR    Anesthesia Start: 1656 Anesthesia Stop: 1803    Procedure: APPENDECTOMY LAPAROSCOPIC (Abdomen) Diagnosis:       RLQ abdominal pain      (RLQ abdominal pain [R10.31])    Surgeons: Cesar Flores MD Provider: Orion Tompkins MD    Anesthesia Type: general ASA Status: 3 - Emergent          Anesthesia Type: general    Vitals  Vitals Value Taken Time   /71 04/13/23 1932   Temp 98 °F (36.7 °C) 04/13/23 1932   Pulse 67 04/13/23 1935   Resp 18 04/13/23 1932   SpO2 96 % 04/13/23 1935   Vitals shown include unvalidated device data.        Post Anesthesia Care and Evaluation    Patient location during evaluation: PACU  Patient participation: complete - patient participated  Level of consciousness: awake  Pain scale: See nurse's notes for pain score.  Pain management: adequate    Airway patency: patent  Anesthetic complications: No anesthetic complications  PONV Status: none  Cardiovascular status: acceptable  Respiratory status: acceptable and spontaneous ventilation  Hydration status: acceptable    Comments: Patient seen and examined postoperatively; vital signs stable; SpO2 greater than or equal to 90%; cardiopulmonary status stable; nausea/vomiting adequately controlled; pain adequately controlled; no apparent anesthesia complications; patient discharged from anesthesia care when discharge criteria were met

## 2023-04-14 NOTE — DISCHARGE SUMMARY
Discharge Summary  Date: 04/14/23  Service: General Surgery  Attending: Cesar Flores    Procedures: Laparoscopic appendectomy  Consults: none  Discharge Diagnoses: Acute Appendicitis  Hospital Course: The patient was admitted to the hospital following an uneventful laparoscopic appendectomy.  She did well overnight.  On postop day #1 the patient was doing well, tolerating diet, ambulatory with minimal pain controlled by Tylenol.  Therefore she was deemed stable for discharge home.    Condition at time of Discharge: stable  Discharge Diet: Regular      Discharge Medications:     Your medication list      CHANGE how you take these medications      Instructions Last Dose Given Next Dose Due   cyclobenzaprine 5 MG tablet  Commonly known as: FLEXERIL  What changed: Another medication with the same name was removed. Continue taking this medication, and follow the directions you see here.      Take 1-2 tablets by mouth 2 (two) times daily as needed for Muscle spasms.          CONTINUE taking these medications      Instructions Last Dose Given Next Dose Due   escitalopram 5 MG tablet  Commonly known as: Lexapro      Take 1 tablet by mouth Daily.       Incassia 0.35 MG tablet  Generic drug: norethindrone      Take 1 tablet by mouth daily.       Shalonda 0.35 MG tablet  Generic drug: norethindrone      Take 1 tablet by mouth daily.       metoprolol succinate XL 25 MG 24 hr tablet  Commonly known as: TOPROL-XL      Take 1 tablet by mouth Daily.       MULTIVITAMIN ADULT (MINERALS) PO           predniSONE 10 MG tablet  Commonly known as: DELTASONE      Take 1 tablet by mouth 2 (Two) Times a Day.       vitamin D 1.25 MG (69744 UT) capsule capsule  Commonly known as: ERGOCALCIFEROL      Take 1 capsule by mouth 1 (One) Time Per Week.          STOP taking these medications    amoxicillin-clavulanate 875-125 MG per tablet  Commonly known as: AUGMENTIN        benzonatate 200 MG capsule  Commonly known as: TESSALON                Activity Instructions     Discharge Activity      1) No driving while taking narcotics.   2) May shower, no tub bath or submerging incisions.  3) Do not lift / push / pull more than 15 lbs.            Follow-up with Dr. Flores in 2 weeks            This document has been electronically signed by Cesar Flores MD on April 14, 2023 10:59 EDT

## 2023-04-14 NOTE — PROGRESS NOTES
GENERAL SURGERY PROGRESS NOTE  Chief Complaint:  Surgery Follow up   LOS: 0 days       Subjective     Interval History:     Feels well. Tolerating Chick Davide A this morning. Had some arm pain last night with IV ABx.     Objective     Vital Signs  Temp:  [97.7 °F (36.5 °C)-98.3 °F (36.8 °C)] 98.1 °F (36.7 °C)  Heart Rate:  [45-82] 69  Resp:  [11-20] 16  BP: (101-147)/() 107/71  FiO2 (%):  [59 %-98 %] 62 %    Physical Exam:   Abdomen soft, incisions CDI  Labs:  Lab Results (last 24 hours)     Procedure Component Value Units Date/Time    Tissue Pathology Exam [[id number]] Collected: 04/13/23 1732    Specimen: Tissue from Large Intestine, Appendix Updated: 04/14/23 0914    Basic Metabolic Panel [[id number]]  (Abnormal) Collected: 04/14/23 0002    Specimen: Blood Updated: 04/14/23 0150     Glucose 113 mg/dL      BUN 9 mg/dL      Creatinine 0.84 mg/dL      Sodium 136 mmol/L      Potassium 4.3 mmol/L      Chloride 103 mmol/L      CO2 23.0 mmol/L      Calcium 9.0 mg/dL      BUN/Creatinine Ratio 10.7     Anion Gap 10.0 mmol/L      eGFR 94.2 mL/min/1.73     Narrative:      GFR Normal >60  Chronic Kidney Disease <60  Kidney Failure <15      CBC & Differential [[id number]]  (Abnormal) Collected: 04/14/23 0002    Specimen: Blood Updated: 04/14/23 0128    Narrative:      The following orders were created for panel order CBC & Differential.  Procedure                               Abnormality         Status                     ---------                               -----------         ------                     CBC Auto Differential[[id number]]        Abnormal            Final result                 Please view results for these tests on the individual orders.    CBC Auto Differential [[id number]]  (Abnormal) Collected: 04/14/23 0002    Specimen: Blood Updated: 04/14/23 0128     WBC 8.20 10*3/mm3      RBC 4.62 10*6/mm3      Hemoglobin 13.1 g/dL      Hematocrit 38.9 %      MCV 84.2 fL      MCH 28.3 pg      MCHC 33.6 g/dL      RDW  13.7 %      RDW-SD 42.9 fl      MPV 9.8 fL      Platelets 236 10*3/mm3      Neutrophil % 91.2 %      Lymphocyte % 7.4 %      Monocyte % 1.2 %      Eosinophil % 0.1 %      Basophil % 0.1 %      Neutrophils, Absolute 7.40 10*3/mm3      Lymphocytes, Absolute 0.60 10*3/mm3      Monocytes, Absolute 0.10 10*3/mm3      Eosinophils, Absolute 0.00 10*3/mm3      Basophils, Absolute 0.00 10*3/mm3      nRBC 0.1 /100 WBC     Lactic Acid, Plasma [[id number]]  (Normal) Collected: 04/13/23 1551    Specimen: Blood Updated: 04/13/23 1645     Lactate 1.0 mmol/L     Pregnancy, Urine - Urine, Clean Catch [[id number]]  (Normal) Collected: 04/13/23 1311    Specimen: Urine, Clean Catch Updated: 04/13/23 1529     HCG, Urine QL Negative    Comprehensive Metabolic Panel [[id number]]  (Abnormal) Collected: 04/13/23 1344    Specimen: Blood Updated: 04/13/23 1416     Glucose 86 mg/dL      BUN 10 mg/dL      Creatinine 0.95 mg/dL      Sodium 140 mmol/L      Potassium 3.7 mmol/L      Chloride 103 mmol/L      CO2 25.0 mmol/L      Calcium 9.7 mg/dL      Total Protein 7.8 g/dL      Albumin 4.4 g/dL      ALT (SGPT) 10 U/L      AST (SGOT) 25 U/L      Alkaline Phosphatase 140 U/L      Total Bilirubin 0.9 mg/dL      Globulin 3.4 gm/dL      A/G Ratio 1.3 g/dL      BUN/Creatinine Ratio 10.5     Anion Gap 12.0 mmol/L      eGFR 81.3 mL/min/1.73     Narrative:      GFR Normal >60  Chronic Kidney Disease <60  Kidney Failure <15      Urinalysis, Microscopic Only - Urine, Clean Catch [[id number]]  (Abnormal) Collected: 04/13/23 1311    Specimen: Urine, Clean Catch Updated: 04/13/23 1353     RBC, UA 0-2 /HPF      WBC, UA 0-2 /HPF      Bacteria, UA Trace /HPF      Squamous Epithelial Cells, UA 3-6 /HPF      Mucus, UA Small/1+ /HPF      Methodology Manual Light Microscopy    Urinalysis With Microscopic If Indicated (No Culture) - Urine, Clean Catch [[id number]]  (Abnormal) Collected: 04/13/23 1311    Specimen: Urine, Clean Catch Updated: 04/13/23 1337     Color, UA Julianna      Appearance, UA Clear     pH, UA 5.5     Specific Gravity, UA 1.036     Glucose, UA Negative     Ketones, UA 15 mg/dL (1+)     Bilirubin, UA Small (1+)     Comment: Confirmation testing is unavailable.  A serum bilirubin is recommended for further assessment.        Blood, UA Negative     Protein, UA 30 mg/dL (1+)     Leuk Esterase, UA Trace     Nitrite, UA Positive     Urobilinogen, UA 1.0 E.U./dL    CBC & Differential [[id number]]  (Normal) Collected: 04/13/23 1311    Specimen: Blood Updated: 04/13/23 1323    Narrative:      The following orders were created for panel order CBC & Differential.  Procedure                               Abnormality         Status                     ---------                               -----------         ------                     CBC Auto Differential[[id number]]        Normal              Final result                 Please view results for these tests on the individual orders.    CBC Auto Differential [[id number]]  (Normal) Collected: 04/13/23 1311    Specimen: Blood Updated: 04/13/23 1323     WBC 8.00 10*3/mm3      RBC 4.89 10*6/mm3      Hemoglobin 13.6 g/dL      Hematocrit 42.5 %      MCV 86.9 fL      MCH 27.9 pg      MCHC 32.1 g/dL      RDW 13.7 %      RDW-SD 41.6 fl      MPV 9.8 fL      Platelets 273 10*3/mm3      Neutrophil % 65.3 %      Lymphocyte % 24.4 %      Monocyte % 7.5 %      Eosinophil % 1.7 %      Basophil % 1.1 %      Neutrophils, Absolute 5.20 10*3/mm3      Lymphocytes, Absolute 1.90 10*3/mm3      Monocytes, Absolute 0.60 10*3/mm3      Eosinophils, Absolute 0.10 10*3/mm3      Basophils, Absolute 0.10 10*3/mm3      nRBC 0.0 /100 WBC            Results Review:     Labs and imaging for today were reviewed.    Assessment & Plan     Lorena Bryson is a 32 y.o. female who is s/p lap appy.      Overall looks well. Afebrile and vitals stable.  Will DC home today.  Patient requests no narcotic pain medicine. DC instructions discussed.          This document has been  electronically signed by eCsar Flores MD on April 14, 2023 10:51 EDT        Cesar Flores MD  04/14/23  10:51 EDT

## 2023-04-14 NOTE — PLAN OF CARE
Goal Outcome Evaluation:  Plan of Care Reviewed With: patient        Progress: improving  Outcome Evaluation: Patient walking, tolerating diet, plan to d/c home today

## 2023-04-14 NOTE — OUTREACH NOTE
Prep Survey    Flowsheet Row Responses   [facility] patient discharged from? [facility]   Is LACE score < 7 ? Yes   Eligibility [facility]   Date of Admission 04/13/23   Date of Discharge 04/14/23   Discharge Disposition Home or Self Care   Discharge diagnosis RLQ abdominal pain  Laparoscopic appendectomy   Does the patient have one of the following disease processes/diagnoses(primary or secondary)? General Surgery   Does the patient have Home health ordered? No   Is there a DME ordered? No   Prep survey completed? Yes          Fariba DALY - Registered Nurse

## 2023-04-14 NOTE — CASE MANAGEMENT/SOCIAL WORK
Case Management Discharge Note      Final Note: home    Provided Post Acute Provider List?: N/A  N/A Provider List Comment: denies dc needs    Selected Continued Care - Discharged on 4/14/2023 Admission date: 4/13/2023 - Discharge disposition: Home or Self Care            Transportation Services  Private: Car    Final Discharge Disposition Code: 01 - home or self-care

## 2023-04-17 ENCOUNTER — TRANSITIONAL CARE MANAGEMENT TELEPHONE ENCOUNTER (OUTPATIENT)
Dept: CALL CENTER | Facility: HOSPITAL | Age: 33
End: 2023-04-17
Payer: COMMERCIAL

## 2023-04-17 LAB
LAB AP CASE REPORT: NORMAL
PATH REPORT.FINAL DX SPEC: NORMAL
PATH REPORT.GROSS SPEC: NORMAL

## 2023-04-17 NOTE — OUTREACH NOTE
Call Center TCM Note    Flowsheet Row Responses   [facility] patient discharged from? [facility]   Does the patient have one of the following disease processes/diagnoses(primary or secondary)? General Surgery   TCM attempt successful? Yes   Call start time 1719   Call end time 1724   Discharge diagnosis RLQ abdominal pain  Laparoscopic appendectomy   Person spoke with today (if not patient) and relationship pt   Meds reviewed with patient/caregiver? Yes   Is the patient having any side effects they believe may be caused by any medication additions or changes? No   Does the patient have all medications related to this admission filled (includes all antibiotics, pain medications, etc.) Yes   Is the patient taking all medications as directed (includes completed medication regime)? Yes   Comments Post-Op 5/3/2023  3:15 PM   Does the patient have an appointment with their PCP within 7 days of discharge? Yes  [4/28/2023 10:30 AM]   Psychosocial issues? No   Did the patient receive a copy of their discharge instructions? Yes   Nursing interventions Reviewed instructions with patient   What is the patient's perception of their health status since discharge? Improving   Nursing interventions Nurse provided patient education   Is the patient /caregiver able to teach back basic post-op care? Take showers only when approved by MD-sponge bathe until then, No tub bath, swimming, or hot tub until instructed by MD, Keep incision areas clean,dry and protected, Lifting as instructed by MD in discharge instructions   Is the patient/caregiver able to teach back signs and symptoms of incisional infection? Increased redness, swelling or pain at the incisonal site, Increased drainage or bleeding, Incisional warmth, Pus or odor from incision, Fever   Is the patient/caregiver able to teach back steps to recovery at home? Rest and rebuild strength, gradually increase activity, Eat a well-balance diet   If the patient is a current smoker,  are they able to teach back resources for cessation? Not a smoker   Is the patient/caregiver able to teach back the hierarchy of who to call/visit for symptoms/problems? PCP, Specialist, Home health nurse, Urgent Care, ED, 911 Yes   TCM call completed? Yes   Wrap up additional comments Pt states she is doing ok, and denies fever, or increased redness, swelling, drainage at incisional site. Reviewed AVS/medicaiton with pt. Pt verified PCP hospital fu appt on 4/28/23, and Post-Op appt on 5/3/23.   Call end time 1724   Would this patient benefit from a Referral to [facility] Social Work? No   Is the patient interested in additional calls from an ambulatory ?  NOTE:  applies to high risk patients requiring additional follow-up. No          Mallory Carey RN    4/17/2023, 17:25 EDT

## 2023-04-17 NOTE — OUTREACH NOTE
Call Center TCM Note    Flowsheet Row Responses   [facility] facility patient discharged from? [facility]   Does the patient have one of the following disease processes/diagnoses(primary or secondary)? General Surgery   TCM attempt successful? No  [ verbal]   Unsuccessful attempts Attempt 1   Comments Post-Op 5/3/2023  3:15 PM          Mallory Carey RN    2023, 14:03 EDT

## 2023-04-18 ENCOUNTER — TELEPHONE (OUTPATIENT)
Dept: SURGERY | Facility: CLINIC | Age: 33
End: 2023-04-18
Payer: COMMERCIAL

## 2023-04-28 ENCOUNTER — LAB (OUTPATIENT)
Dept: FAMILY MEDICINE CLINIC | Facility: CLINIC | Age: 33
End: 2023-04-28
Payer: COMMERCIAL

## 2023-04-28 ENCOUNTER — OFFICE VISIT (OUTPATIENT)
Dept: FAMILY MEDICINE CLINIC | Facility: CLINIC | Age: 33
End: 2023-04-28
Payer: COMMERCIAL

## 2023-04-28 VITALS
OXYGEN SATURATION: 97 % | DIASTOLIC BLOOD PRESSURE: 91 MMHG | SYSTOLIC BLOOD PRESSURE: 136 MMHG | WEIGHT: 227 LBS | BODY MASS INDEX: 40.22 KG/M2 | HEIGHT: 63 IN | TEMPERATURE: 98 F | HEART RATE: 67 BPM

## 2023-04-28 DIAGNOSIS — Z90.49 S/P LAPAROSCOPIC APPENDECTOMY: Primary | ICD-10-CM

## 2023-04-28 DIAGNOSIS — T14.8XXA BRUISING: ICD-10-CM

## 2023-04-28 LAB
ALBUMIN SERPL-MCNC: 4.3 G/DL (ref 3.5–5.2)
ALBUMIN/GLOB SERPL: 1.4 G/DL
ALP SERPL-CCNC: 135 U/L (ref 39–117)
ALT SERPL W P-5'-P-CCNC: 12 U/L (ref 1–33)
ANION GAP SERPL CALCULATED.3IONS-SCNC: 8 MMOL/L (ref 5–15)
AST SERPL-CCNC: 28 U/L (ref 1–32)
BASOPHILS # BLD AUTO: 0.05 10*3/MM3 (ref 0–0.2)
BASOPHILS NFR BLD AUTO: 0.8 % (ref 0–1.5)
BILIRUB SERPL-MCNC: 0.3 MG/DL (ref 0–1.2)
BUN SERPL-MCNC: 12 MG/DL (ref 6–20)
BUN/CREAT SERPL: 15 (ref 7–25)
CALCIUM SPEC-SCNC: 9.2 MG/DL (ref 8.6–10.5)
CHLORIDE SERPL-SCNC: 106 MMOL/L (ref 98–107)
CO2 SERPL-SCNC: 26 MMOL/L (ref 22–29)
CREAT SERPL-MCNC: 0.8 MG/DL (ref 0.57–1)
DEPRECATED RDW RBC AUTO: 39.1 FL (ref 37–54)
EGFRCR SERPLBLD CKD-EPI 2021: 99.9 ML/MIN/1.73
EOSINOPHIL # BLD AUTO: 0.2 10*3/MM3 (ref 0–0.4)
EOSINOPHIL NFR BLD AUTO: 3 % (ref 0.3–6.2)
ERYTHROCYTE [DISTWIDTH] IN BLOOD BY AUTOMATED COUNT: 12.7 % (ref 12.3–15.4)
GLOBULIN UR ELPH-MCNC: 3 GM/DL
GLUCOSE SERPL-MCNC: 94 MG/DL (ref 65–99)
HCT VFR BLD AUTO: 40.7 % (ref 34–46.6)
HGB BLD-MCNC: 13.6 G/DL (ref 12–15.9)
IMM GRANULOCYTES # BLD AUTO: 0.01 10*3/MM3 (ref 0–0.05)
IMM GRANULOCYTES NFR BLD AUTO: 0.2 % (ref 0–0.5)
LYMPHOCYTES # BLD AUTO: 2.02 10*3/MM3 (ref 0.7–3.1)
LYMPHOCYTES NFR BLD AUTO: 30.7 % (ref 19.6–45.3)
MCH RBC QN AUTO: 28.4 PG (ref 26.6–33)
MCHC RBC AUTO-ENTMCNC: 33.4 G/DL (ref 31.5–35.7)
MCV RBC AUTO: 85 FL (ref 79–97)
MONOCYTES # BLD AUTO: 0.61 10*3/MM3 (ref 0.1–0.9)
MONOCYTES NFR BLD AUTO: 9.3 % (ref 5–12)
NEUTROPHILS NFR BLD AUTO: 3.7 10*3/MM3 (ref 1.7–7)
NEUTROPHILS NFR BLD AUTO: 56 % (ref 42.7–76)
NRBC BLD AUTO-RTO: 0 /100 WBC (ref 0–0.2)
PLATELET # BLD AUTO: 256 10*3/MM3 (ref 140–450)
PMV BLD AUTO: 11.6 FL (ref 6–12)
POTASSIUM SERPL-SCNC: 4.3 MMOL/L (ref 3.5–5.2)
PROT SERPL-MCNC: 7.3 G/DL (ref 6–8.5)
RBC # BLD AUTO: 4.79 10*6/MM3 (ref 3.77–5.28)
SODIUM SERPL-SCNC: 140 MMOL/L (ref 136–145)
WBC NRBC COR # BLD: 6.59 10*3/MM3 (ref 3.4–10.8)

## 2023-04-28 PROCEDURE — 80053 COMPREHEN METABOLIC PANEL: CPT | Performed by: FAMILY MEDICINE

## 2023-04-28 PROCEDURE — 85025 COMPLETE CBC W/AUTO DIFF WBC: CPT | Performed by: FAMILY MEDICINE

## 2023-04-28 PROCEDURE — 36415 COLL VENOUS BLD VENIPUNCTURE: CPT | Performed by: FAMILY MEDICINE

## 2023-04-28 RX ORDER — FLUCONAZOLE 150 MG/1
150 TABLET ORAL ONCE
Qty: 1 TABLET | Refills: 0 | Status: SHIPPED | OUTPATIENT
Start: 2023-04-28 | End: 2023-04-29

## 2023-04-28 RX ORDER — ESCITALOPRAM OXALATE 10 MG/1
10 TABLET ORAL DAILY
Qty: 90 TABLET | Refills: 1 | Status: SHIPPED | OUTPATIENT
Start: 2023-04-28

## 2023-04-28 NOTE — PROGRESS NOTES
"Transitional Care Follow Up Visit  Subjective     Lorena Bryson is a 32 y.o. female who presents for a transitional care management visit.    Within 48 business hours after discharge our office contacted her via telephone to coordinate her care and needs.      I reviewed and discussed the details of that call along with the discharge summary, hospital problems, inpatient lab results, inpatient diagnostic studies, and consultation reports with Lorena.     Current outpatient and discharge medications have been reconciled for the patient.  Reviewed by: Eden Rojas MD          4/14/2023     6:36 PM   Date of TCM Phone Call   [facility]   Date of Admission 4/13/2023   Date of Discharge 4/14/2023   Discharge Disposition Home or Self Care     Risk for Readmission (LACE) No data recorded      History of Present Illness   Course During Hospital Stay:  She had symptoms of nausea and diarrhea and abdominal pain.  She had a follow up already planned with her gynecologist and had an ultrasound that showed normal ovaries.  She was referred to the ER for pain and had a CT scan that showed appendicitis. The patient was admitted to the hospital following an uneventful laparoscopic appendectomy.  She did well overnight.  On postop day #1 the patient was doing well, tolerating diet, ambulatory with minimal pain controlled by Tylenol.  She uses Advil otc as needed.  She has been using otc AZO for some \"yeast infection\" symptoms.  LMP now. She concerned about the amount of bruising on her abdomen.      The following portions of the patient's history were reviewed and updated as appropriate: allergies, current medications, past family history, past medical history, past social history, past surgical history and problem list.    Review of Systems    Objective   Physical Exam  Constitutional:       General: She is not in acute distress.     Appearance: She is well-developed.   HENT:      Head: Normocephalic. "   Eyes:      General: Lids are normal.   Neck:      Thyroid: No thyroid mass or thyromegaly.      Trachea: Trachea normal.   Cardiovascular:      Rate and Rhythm: Normal rate and regular rhythm.      Heart sounds: Normal heart sounds.   Pulmonary:      Effort: Pulmonary effort is normal.      Breath sounds: Normal breath sounds.   Abdominal:      Palpations: Abdomen is soft.      Tenderness: There is abdominal tenderness.   Musculoskeletal:      Cervical back: Normal range of motion.   Lymphadenopathy:      Cervical: No cervical adenopathy.   Skin:     General: Skin is warm and dry.      Findings: Bruising present.   Neurological:      Mental Status: She is alert and oriented to person, place, and time.   Psychiatric:         Attention and Perception: She is attentive.         Mood and Affect: Mood normal.         Speech: Speech normal.         Behavior: Behavior normal.         Assessment & Plan   Diagnoses and all orders for this visit:    1. S/P laparoscopic appendectomy (Primary)    2. Bruising  -     CBC & Differential  -     Comprehensive Metabolic Panel    Other orders  -     fluconazole (Diflucan) 150 MG tablet; Take 1 tablet by mouth 1 (One) Time for 1 dose.  Dispense: 1 tablet; Refill: 0  -     escitalopram (Lexapro) 10 MG tablet; Take 1 tablet by mouth Daily.  Dispense: 90 tablet; Refill: 1

## 2023-05-03 ENCOUNTER — OFFICE VISIT (OUTPATIENT)
Dept: SURGERY | Facility: CLINIC | Age: 33
End: 2023-05-03
Payer: COMMERCIAL

## 2023-05-03 VITALS
BODY MASS INDEX: 39.51 KG/M2 | WEIGHT: 223 LBS | HEIGHT: 63 IN | OXYGEN SATURATION: 93 % | TEMPERATURE: 97.7 F | DIASTOLIC BLOOD PRESSURE: 86 MMHG | SYSTOLIC BLOOD PRESSURE: 136 MMHG | HEART RATE: 77 BPM

## 2023-05-03 DIAGNOSIS — Z09 ENCOUNTER FOR FOLLOW-UP: Primary | ICD-10-CM

## 2023-05-03 NOTE — PROGRESS NOTES
CHIEF COMPLAINT:    Chief Complaint   Patient presents with   • PO lap appy       HISTORY OF PRESENT ILLNESS:    Lorena Bryson is a 32 y.o. female who underwent laparoscopic appendectomy on 4/13/2023.  Pathology confirmed acute appendicitis.  She returns today for follow-up.  Overall she states that she is essentially back to her baseline with some reduction in her appetite.  She notes no abdominal pain.  No fevers or chills.  She is having regular bowel function.  She did have some bruising in the periumbilical region which is resolving.    EXAM:  Vitals:    05/03/23 1509   BP: 136/86   Pulse: 77   Temp: 97.7 °F (36.5 °C)   SpO2: 93%         Incisions healing appropriately, abdomen soft    ASSESSMENT:    Status post laparoscopic appendectomy    PLAN:    Overall appears to be doing well.  Can return to normal activities.  See us as needed.          This document has been electronically signed by Cesar Flores MD on May 3, 2023 15:16 EDT

## 2023-05-31 DIAGNOSIS — I10 PRIMARY HYPERTENSION: ICD-10-CM

## 2023-05-31 RX ORDER — METOPROLOL SUCCINATE 25 MG/1
25 TABLET, EXTENDED RELEASE ORAL DAILY
Qty: 90 TABLET | Refills: 1 | Status: CANCELLED | OUTPATIENT
Start: 2023-05-31

## 2023-06-01 DIAGNOSIS — I10 PRIMARY HYPERTENSION: ICD-10-CM

## 2023-06-02 RX ORDER — METOPROLOL SUCCINATE 25 MG/1
25 TABLET, EXTENDED RELEASE ORAL DAILY
Qty: 90 TABLET | Refills: 1 | Status: SHIPPED | OUTPATIENT
Start: 2023-06-02

## 2024-10-28 ENCOUNTER — LAB (OUTPATIENT)
Dept: FAMILY MEDICINE CLINIC | Facility: CLINIC | Age: 34
End: 2024-10-28
Payer: COMMERCIAL

## 2024-10-28 ENCOUNTER — OFFICE VISIT (OUTPATIENT)
Dept: FAMILY MEDICINE CLINIC | Facility: CLINIC | Age: 34
End: 2024-10-28
Payer: COMMERCIAL

## 2024-10-28 VITALS
HEIGHT: 63 IN | BODY MASS INDEX: 39.97 KG/M2 | HEART RATE: 93 BPM | SYSTOLIC BLOOD PRESSURE: 138 MMHG | DIASTOLIC BLOOD PRESSURE: 88 MMHG | OXYGEN SATURATION: 96 % | WEIGHT: 225.6 LBS | TEMPERATURE: 98 F

## 2024-10-28 DIAGNOSIS — Z00.00 WELLNESS EXAMINATION: Primary | ICD-10-CM

## 2024-10-28 DIAGNOSIS — Z00.00 WELLNESS EXAMINATION: ICD-10-CM

## 2024-10-28 LAB
25(OH)D3 SERPL-MCNC: 22.7 NG/ML (ref 30–100)
ALBUMIN SERPL-MCNC: 4.1 G/DL (ref 3.5–5.2)
ALBUMIN/GLOB SERPL: 1.2 G/DL
ALP SERPL-CCNC: 110 U/L (ref 39–117)
ALT SERPL W P-5'-P-CCNC: 6 U/L (ref 1–33)
ANION GAP SERPL CALCULATED.3IONS-SCNC: 9.4 MMOL/L (ref 5–15)
AST SERPL-CCNC: 15 U/L (ref 1–32)
BASOPHILS # BLD AUTO: 0.05 10*3/MM3 (ref 0–0.2)
BASOPHILS NFR BLD AUTO: 0.5 % (ref 0–1.5)
BILIRUB SERPL-MCNC: 0.5 MG/DL (ref 0–1.2)
BUN SERPL-MCNC: 13 MG/DL (ref 6–20)
BUN/CREAT SERPL: 13.4 (ref 7–25)
CALCIUM SPEC-SCNC: 9.6 MG/DL (ref 8.6–10.5)
CANCER AG125 SERPL QL: 50.8 U/ML (ref 0–38.1)
CHLORIDE SERPL-SCNC: 105 MMOL/L (ref 98–107)
CHOLEST SERPL-MCNC: 239 MG/DL (ref 0–200)
CO2 SERPL-SCNC: 24.6 MMOL/L (ref 22–29)
CREAT SERPL-MCNC: 0.97 MG/DL (ref 0.57–1)
DEPRECATED RDW RBC AUTO: 37.4 FL (ref 37–54)
EGFRCR SERPLBLD CKD-EPI 2021: 78.8 ML/MIN/1.73
EOSINOPHIL # BLD AUTO: 0.33 10*3/MM3 (ref 0–0.4)
EOSINOPHIL NFR BLD AUTO: 3.3 % (ref 0.3–6.2)
ERYTHROCYTE [DISTWIDTH] IN BLOOD BY AUTOMATED COUNT: 12.2 % (ref 12.3–15.4)
GLOBULIN UR ELPH-MCNC: 3.3 GM/DL
GLUCOSE SERPL-MCNC: 86 MG/DL (ref 65–99)
HCT VFR BLD AUTO: 40.7 % (ref 34–46.6)
HDLC SERPL-MCNC: 50 MG/DL (ref 40–60)
HGB BLD-MCNC: 13.5 G/DL (ref 12–15.9)
IMM GRANULOCYTES # BLD AUTO: 0.03 10*3/MM3 (ref 0–0.05)
IMM GRANULOCYTES NFR BLD AUTO: 0.3 % (ref 0–0.5)
IRON 24H UR-MRATE: 66 MCG/DL (ref 37–145)
IRON SATN MFR SERPL: 18 % (ref 20–50)
LDLC SERPL CALC-MCNC: 164 MG/DL (ref 0–100)
LDLC/HDLC SERPL: 3.24 {RATIO}
LYMPHOCYTES # BLD AUTO: 2.72 10*3/MM3 (ref 0.7–3.1)
LYMPHOCYTES NFR BLD AUTO: 27.3 % (ref 19.6–45.3)
MCH RBC QN AUTO: 28.2 PG (ref 26.6–33)
MCHC RBC AUTO-ENTMCNC: 33.2 G/DL (ref 31.5–35.7)
MCV RBC AUTO: 85.1 FL (ref 79–97)
MONOCYTES # BLD AUTO: 0.7 10*3/MM3 (ref 0.1–0.9)
MONOCYTES NFR BLD AUTO: 7 % (ref 5–12)
NEUTROPHILS NFR BLD AUTO: 6.12 10*3/MM3 (ref 1.7–7)
NEUTROPHILS NFR BLD AUTO: 61.6 % (ref 42.7–76)
NRBC BLD AUTO-RTO: 0 /100 WBC (ref 0–0.2)
PLATELET # BLD AUTO: 292 10*3/MM3 (ref 140–450)
PMV BLD AUTO: 11.5 FL (ref 6–12)
POTASSIUM SERPL-SCNC: 4.3 MMOL/L (ref 3.5–5.2)
PROT SERPL-MCNC: 7.4 G/DL (ref 6–8.5)
RBC # BLD AUTO: 4.78 10*6/MM3 (ref 3.77–5.28)
SODIUM SERPL-SCNC: 139 MMOL/L (ref 136–145)
TIBC SERPL-MCNC: 375 MCG/DL (ref 298–536)
TRANSFERRIN SERPL-MCNC: 252 MG/DL (ref 200–360)
TRIGL SERPL-MCNC: 136 MG/DL (ref 0–150)
TSH SERPL DL<=0.05 MIU/L-ACNC: 8.8 UIU/ML (ref 0.27–4.2)
VIT B12 BLD-MCNC: 270 PG/ML (ref 211–946)
VLDLC SERPL-MCNC: 25 MG/DL (ref 5–40)
WBC NRBC COR # BLD AUTO: 9.95 10*3/MM3 (ref 3.4–10.8)

## 2024-10-28 PROCEDURE — 84466 ASSAY OF TRANSFERRIN: CPT | Performed by: FAMILY MEDICINE

## 2024-10-28 PROCEDURE — 99395 PREV VISIT EST AGE 18-39: CPT | Performed by: FAMILY MEDICINE

## 2024-10-28 PROCEDURE — 82306 VITAMIN D 25 HYDROXY: CPT | Performed by: FAMILY MEDICINE

## 2024-10-28 PROCEDURE — 83540 ASSAY OF IRON: CPT | Performed by: FAMILY MEDICINE

## 2024-10-28 PROCEDURE — 80061 LIPID PANEL: CPT | Performed by: FAMILY MEDICINE

## 2024-10-28 PROCEDURE — 82607 VITAMIN B-12: CPT | Performed by: FAMILY MEDICINE

## 2024-10-28 PROCEDURE — 36415 COLL VENOUS BLD VENIPUNCTURE: CPT | Performed by: FAMILY MEDICINE

## 2024-10-28 PROCEDURE — 80050 GENERAL HEALTH PANEL: CPT | Performed by: FAMILY MEDICINE

## 2024-10-28 PROCEDURE — 86304 IMMUNOASSAY TUMOR CA 125: CPT | Performed by: FAMILY MEDICINE

## 2024-10-28 RX ORDER — BUPROPION HYDROCHLORIDE 150 MG/1
150 TABLET ORAL EVERY MORNING
Qty: 90 TABLET | Refills: 1 | Status: SHIPPED | OUTPATIENT
Start: 2024-10-28

## 2024-10-28 NOTE — PROGRESS NOTES
"Subjective   Lorena Bryson is a 33 y.o. female and is here for a comprehensive physical exam. The patient reports problems - heavy menstrual bleeding, recent had IUD removed and started on OCPs.  FH of mother with elevated  and endometriosis and MS. .    Do you take any herbs or supplements that were not prescribed by a doctor? no  Are you taking calcium supplements? no  Are you taking aspirin daily? no    The following portions of the patient's history were reviewed and updated as appropriate: allergies, current medications, past family history, past medical history, past social history, past surgical history, and problem list.    Review of Systems  Do you have pain that bothers you in your daily life? not asked  Pertinent items are noted in HPI.    Objective   /88   Pulse 93   Temp 98 °F (36.7 °C) (Infrared)   Ht 160 cm (63\")   Wt 102 kg (225 lb 9.6 oz)   LMP 10/16/2024 (Exact Date)   SpO2 96%   BMI 39.96 kg/m²   General appearance: alert, appears stated age, and cooperative  Head: Normocephalic, without obvious abnormality, atraumatic  Eyes: conjunctivae/corneas clear. PERRL, EOM's intact. Fundi benign.  Ears: normal TM's and external ear canals both ears  Throat: lips, mucosa, and tongue normal; teeth and gums normal  Neck: no adenopathy, no JVD, supple, symmetrical, trachea midline, and thyroid not enlarged, symmetric, no tenderness/mass/nodules  Lungs: clear to auscultation bilaterally  Heart: regular rate and rhythm, S1, S2 normal, no murmur, click, rub or gallop  Abdomen: soft, non-tender; bowel sounds normal; no masses,  no organomegaly  Extremities: extremities normal, atraumatic, no cyanosis or edema  Skin: Skin color, texture, turgor normal. No rashes or lesions  Lymph nodes: Cervical, supraclavicular, and axillary nodes normal.  Neurologic: Grossly normal     No visits with results within 1 Week(s) from this visit.   Latest known visit with results is:   Office Visit on 04/28/2023 "   Component Date Value Ref Range Status    Glucose 04/28/2023 94  65 - 99 mg/dL Final    BUN 04/28/2023 12  6 - 20 mg/dL Final    Creatinine 04/28/2023 0.80  0.57 - 1.00 mg/dL Final    Sodium 04/28/2023 140  136 - 145 mmol/L Final    Potassium 04/28/2023 4.3  3.5 - 5.2 mmol/L Final    Chloride 04/28/2023 106  98 - 107 mmol/L Final    CO2 04/28/2023 26.0  22.0 - 29.0 mmol/L Final    Calcium 04/28/2023 9.2  8.6 - 10.5 mg/dL Final    Total Protein 04/28/2023 7.3  6.0 - 8.5 g/dL Final    Albumin 04/28/2023 4.3  3.5 - 5.2 g/dL Final    ALT (SGPT) 04/28/2023 12  1 - 33 U/L Final    AST (SGOT) 04/28/2023 28  1 - 32 U/L Final    Alkaline Phosphatase 04/28/2023 135 (H)  39 - 117 U/L Final    Total Bilirubin 04/28/2023 0.3  0.0 - 1.2 mg/dL Final    Globulin 04/28/2023 3.0  gm/dL Final    A/G Ratio 04/28/2023 1.4  g/dL Final    BUN/Creatinine Ratio 04/28/2023 15.0  7.0 - 25.0 Final    Anion Gap 04/28/2023 8.0  5.0 - 15.0 mmol/L Final    eGFR 04/28/2023 99.9  >60.0 mL/min/1.73 Final    WBC 04/28/2023 6.59  3.40 - 10.80 10*3/mm3 Final    RBC 04/28/2023 4.79  3.77 - 5.28 10*6/mm3 Final    Hemoglobin 04/28/2023 13.6  12.0 - 15.9 g/dL Final    Hematocrit 04/28/2023 40.7  34.0 - 46.6 % Final    MCV 04/28/2023 85.0  79.0 - 97.0 fL Final    MCH 04/28/2023 28.4  26.6 - 33.0 pg Final    MCHC 04/28/2023 33.4  31.5 - 35.7 g/dL Final    RDW 04/28/2023 12.7  12.3 - 15.4 % Final    RDW-SD 04/28/2023 39.1  37.0 - 54.0 fl Final    MPV 04/28/2023 11.6  6.0 - 12.0 fL Final    Platelets 04/28/2023 256  140 - 450 10*3/mm3 Final    Neutrophil % 04/28/2023 56.0  42.7 - 76.0 % Final    Lymphocyte % 04/28/2023 30.7  19.6 - 45.3 % Final    Monocyte % 04/28/2023 9.3  5.0 - 12.0 % Final    Eosinophil % 04/28/2023 3.0  0.3 - 6.2 % Final    Basophil % 04/28/2023 0.8  0.0 - 1.5 % Final    Immature Grans % 04/28/2023 0.2  0.0 - 0.5 % Final    Neutrophils, Absolute 04/28/2023 3.70  1.70 - 7.00 10*3/mm3 Final    Lymphocytes, Absolute 04/28/2023 2.02  0.70 -  3.10 10*3/mm3 Final    Monocytes, Absolute 04/28/2023 0.61  0.10 - 0.90 10*3/mm3 Final    Eosinophils, Absolute 04/28/2023 0.20  0.00 - 0.40 10*3/mm3 Final    Basophils, Absolute 04/28/2023 0.05  0.00 - 0.20 10*3/mm3 Final    Immature Grans, Absolute 04/28/2023 0.01  0.00 - 0.05 10*3/mm3 Final    nRBC 04/28/2023 0.0  0.0 - 0.2 /100 WBC Final       Assessment & Plan   Healthy female exam.   Diagnoses and all orders for this visit:    1. Wellness examination (Primary)  -     CBC & Differential  -     Comprehensive Metabolic Panel  -     Lipid Panel  -     TSH  -     ; Future  -     Vitamin B12  -     Vitamin D,25-Hydroxy  -     Iron Profile    Other orders  -     buPROPion XL (Wellbutrin XL) 150 MG 24 hr tablet; Take 1 tablet by mouth Every Morning.  Dispense: 90 tablet; Refill: 1      1. Well exam.   2. Patient Counseling:  --Nutrition: Stressed importance of moderation in sodium/caffeine intake, saturated fat and cholesterol, caloric balance, sufficient intake of fresh fruits, vegetables, fiber, calcium, iron, and 1 mg of folate supplement per day (for females capable of pregnancy).  --Exercise: Stressed the importance of regular exercise.   --Dental health: Discussed importance of regular tooth brushing, flossing, and dental visits.  --Immunizations reviewed.    3. Discussed the patient's BMI with her.  The BMI is above average; BMI management plan is completed  4. Follow up in one year

## 2024-10-29 ENCOUNTER — PATIENT MESSAGE (OUTPATIENT)
Dept: FAMILY MEDICINE CLINIC | Facility: CLINIC | Age: 34
End: 2024-10-29
Payer: COMMERCIAL

## 2024-10-29 RX ORDER — LEVOTHYROXINE SODIUM 50 UG/1
50 TABLET ORAL
Qty: 90 TABLET | Refills: 1 | Status: SHIPPED | OUTPATIENT
Start: 2024-10-29

## 2024-10-29 RX ORDER — ROSUVASTATIN CALCIUM 5 MG/1
5 TABLET, COATED ORAL DAILY
Qty: 90 TABLET | Refills: 1 | Status: SHIPPED | OUTPATIENT
Start: 2024-10-29

## 2024-12-19 ENCOUNTER — PATIENT MESSAGE (OUTPATIENT)
Dept: FAMILY MEDICINE CLINIC | Facility: CLINIC | Age: 34
End: 2024-12-19
Payer: COMMERCIAL

## 2025-04-08 ENCOUNTER — TELEPHONE (OUTPATIENT)
Dept: FAMILY MEDICINE CLINIC | Facility: CLINIC | Age: 35
End: 2025-04-08
Payer: COMMERCIAL

## 2025-04-08 ENCOUNTER — OFFICE VISIT (OUTPATIENT)
Dept: FAMILY MEDICINE CLINIC | Facility: CLINIC | Age: 35
End: 2025-04-08
Payer: COMMERCIAL

## 2025-04-08 ENCOUNTER — NURSE TRIAGE (OUTPATIENT)
Dept: CALL CENTER | Facility: HOSPITAL | Age: 35
End: 2025-04-08
Payer: COMMERCIAL

## 2025-04-08 VITALS
DIASTOLIC BLOOD PRESSURE: 94 MMHG | HEIGHT: 62 IN | WEIGHT: 222 LBS | OXYGEN SATURATION: 97 % | SYSTOLIC BLOOD PRESSURE: 138 MMHG | TEMPERATURE: 98 F | BODY MASS INDEX: 40.85 KG/M2 | HEART RATE: 78 BPM

## 2025-04-08 DIAGNOSIS — I10 PRIMARY HYPERTENSION: Primary | ICD-10-CM

## 2025-04-08 DIAGNOSIS — J02.9 PHARYNGITIS, UNSPECIFIED ETIOLOGY: ICD-10-CM

## 2025-04-08 PROCEDURE — 99214 OFFICE O/P EST MOD 30 MIN: CPT | Performed by: FAMILY MEDICINE

## 2025-04-08 RX ORDER — METOPROLOL SUCCINATE 50 MG/1
50 TABLET, EXTENDED RELEASE ORAL DAILY
Qty: 90 TABLET | Refills: 3 | Status: SHIPPED | OUTPATIENT
Start: 2025-04-08

## 2025-04-08 RX ORDER — AZITHROMYCIN 250 MG/1
TABLET, FILM COATED ORAL
Qty: 6 TABLET | Refills: 0 | Status: SHIPPED | OUTPATIENT
Start: 2025-04-08

## 2025-04-08 RX ORDER — PREDNISONE 10 MG/1
10 TABLET ORAL 2 TIMES DAILY
Qty: 10 TABLET | Refills: 0 | Status: SHIPPED | OUTPATIENT
Start: 2025-04-08

## 2025-04-08 NOTE — ASSESSMENT & PLAN NOTE
Hypertension is uncontrolled  Medication changes per orders.  Dietary sodium restriction.  Regular aerobic exercise.  Ambulatory blood pressure monitoring.  Blood pressure will be reassessedin 3 months.

## 2025-04-08 NOTE — TELEPHONE ENCOUNTER
"Called on call phone at 0511 am stating that she was experiencing shortness of breath, wondering if she has \"either Covid or pneumonia\".  She reports high BP earlier in the day, but now BP \"has regulated\".  She states that her O2 \"is now dropping to 92-93 when try to fall asleep\".  Asking if she needs to come in to office today or is she should go to ER.  Advised through voice text to call office at 8am ans ask for same day sick appointment.  Also advised to go to ER, if she feels she can not wait until 8am to call office.  "

## 2025-04-08 NOTE — TELEPHONE ENCOUNTER
"Caller states sick with cough and mild shortness of breath. Caller states some dizziness. Caller denies any palpitations. Caller sates pulse ox 92%. Caller denies fever. Caller advised per guideline. Caller advised not able to reach her PCP or should become worse seek emergent care.         Reason for Disposition   [1] MILD difficulty breathing (e.g., minimal/no SOB at rest, SOB with walking, pulse <100) AND [2] NEW-onset or WORSE than normal    Additional Information   Negative: SEVERE difficulty breathing (e.g., struggling for each breath, speaks in single words)   Negative: [1] Breathing stopped AND [2] hasn't returned   Negative: Choking on something   Negative: Bluish (or gray) lips or face now   Negative: Difficult to awaken or acting confused (e.g., disoriented, slurred speech)   Negative: Passed out (i.e., lost consciousness, collapsed and was not responding)   Negative: Wheezing started suddenly after medicine, an allergic food or bee sting   Negative: Stridor (harsh sound while breathing in)   Negative: Slow, shallow and weak breathing   Negative: Sounds like a life-threatening emergency to the triager   Negative: Chest pain   Negative: [1] Wheezing (high pitched whistling sound) AND [2] previous asthma attacks or use of asthma medicines   Negative: [1] Difficulty breathing AND [2] only present when coughing   Negative: [1] Difficulty breathing AND [2] only from stuffy or runny nose   Negative: [1] Difficulty breathing AND [2] within 14 days of COVID-19 Exposure   Negative: [1] MODERATE difficulty breathing (e.g., speaks in phrases, SOB even at rest, pulse 100-120) AND [2] NEW-onset or WORSE than normal   Negative: Oxygen level (e.g., pulse oximetry) 90 percent or lower   Negative: Wheezing can be heard across the room   Negative: Drooling or spitting out saliva (because can't swallow)   Negative: History of prior \"blood clot\" in leg or lungs (i.e., deep vein thrombosis, pulmonary embolism)   Negative: " "History of inherited increased risk of blood clots (e.g., Factor 5 Leiden, Anti-thrombin 3, Protein C or Protein S deficiency, Prothrombin mutation)   Negative: Major surgery in the past month   Negative: Hip or leg fracture (broken bone) in past month (or had cast on leg or ankle in past month)   Negative: Illness requiring prolonged bedrest in past month (e.g., immobilization, long hospital stay)   Negative: Long-distance travel in past month (e.g., car, bus, train, plane; with trip lasting 6 or more hours)   Negative: Cancer treatment in past six months (or has cancer now)   Negative: Extra heartbeats, irregular heart beating, or heart is beating very fast  (i.e., \"palpitations\")   Negative: Fever > 103 F (39.4 C)   Negative: [1] Fever > 101 F (38.3 C) AND [2] age > 60 years   Negative: [1] Fever > 100.0 F (37.8 C) AND [2] bedridden (e.g., CVA, chronic illness, recovering from surgery)   Negative: [1] Fever > 100.0 F (37.8 C) AND [2] diabetes mellitus or weak immune system (e.g., HIV positive, cancer chemo, splenectomy, organ transplant, chronic steroids)   Negative: [1] Periods where breathing stops and then resumes normally AND [2] bedridden (e.g., CVA)   Negative: Pregnant or postpartum (from 0 to 6 weeks after delivery)   Negative: Patient sounds very sick or weak to the triager    Answer Assessment - Initial Assessment Questions  1. RESPIRATORY STATUS: \"Describe your breathing?\" (e.g., wheezing, shortness of breath, unable to speak, severe coughing)       Mild shortness of breath, cough   2. ONSET: \"When did this breathing problem begin?\"       Monday   3. PATTERN \"Does the difficult breathing come and go, or has it been constant since it started?\"       Constant   4. SEVERITY: \"How bad is your breathing?\" (e.g., mild, moderate, severe)     - MILD: No SOB at rest, mild SOB with walking, speaks normally in sentences, can lie down, no retractions, pulse < 100.     - MODERATE: SOB at rest, SOB with minimal " "exertion and prefers to sit, cannot lie down flat, speaks in phrases, mild retractions, audible wheezing, pulse 100-120.     - SEVERE: Very SOB at rest, speaks in single words, struggling to breathe, sitting hunched forward, retractions, pulse > 120       Mild   5. RECURRENT SYMPTOM: \"Have you had difficulty breathing before?\" If Yes, ask: \"When was the last time?\" and \"What happened that time?\"       Denies   6. CARDIAC HISTORY: \"Do you have any history of heart disease?\" (e.g., heart attack, angina, bypass surgery, angioplasty)       Rhythm issue   7. LUNG HISTORY: \"Do you have any history of lung disease?\"  (e.g., pulmonary embolus, asthma, emphysema)      Denies   8. CAUSE: \"What do you think is causing the breathing problem?\"       Denies   9. OTHER SYMPTOMS: \"Do you have any other symptoms? (e.g., dizziness, runny nose, cough, chest pain, fever)      Dizziness last week and nausea   10. O2 SATURATION MONITOR:  \"Do you use an oxygen saturation monitor (pulse oximeter) at home?\" If Yes, ask: \"What is your reading (oxygen level) today?\" \"What is your usual oxygen saturation reading?\" (e.g., 95%)        92  11. PREGNANCY: \"Is there any chance you are pregnant?\" \"When was your last menstrual period?\"        Denies   12. TRAVEL: \"Have you traveled out of the country in the last month?\" (e.g., travel history, exposures)        Denies    Protocols used: Breathing Difficulty-ADULT-AH    "

## 2025-04-08 NOTE — PROGRESS NOTES
"Chief Complaint  Hypertension, Shortness of Breath, Cough, and Nausea    Subjective        Lorena Bryson presents to [facility]  History of Present Illness  She reports that her blood pressure usually elevates when she takes hormonal medicine.  She has been on Orliissa for the last few months and her blood pressure has gradually worsened.  It was as high as 157/113 yesterday.  Since yesterday she has begun having symptoms of illness - cough, congestion, swelling of her throat.  No fever or chills.  No known exposure to illness.   Hypertension  This is a chronic problem. The problem has been worse since onset. The problem is uncontrolled. Associated symptoms include neck pain and shortness of breath. Pertinent negatives include no blurred vision, chest pain, peripheral edema or sweats. There are no associated agents to hypertension.   Shortness of Breath  Associated symptoms include neck pain. Pertinent negatives include no chest pain.   Cough  Associated symptoms include shortness of breath. Pertinent negatives include no chest pain or sweats.   Nausea  Symptoms include cough, nausea and neck pain.    Pertinent negative symptoms include no chest pain.       Objective   Vital Signs:  /94   Pulse 78   Temp 98 °F (36.7 °C) (Temporal)   Ht 157.5 cm (62\")   Wt 101 kg (222 lb)   SpO2 97%   BMI 40.60 kg/m²   Estimated body mass index is 40.6 kg/m² as calculated from the following:    Height as of this encounter: 157.5 cm (62\").    Weight as of this encounter: 101 kg (222 lb).            Physical Exam  Vitals and nursing note reviewed.   Constitutional:       General: She is not in acute distress.     Appearance: She is well-developed.   HENT:      Head: Normocephalic.   Eyes:      General: Lids are normal.      Conjunctiva/sclera: Conjunctivae normal.   Neck:      Thyroid: No thyroid mass or thyromegaly.      Trachea: Trachea normal.   Cardiovascular:      Rate and Rhythm: " Normal rate and regular rhythm.      Heart sounds: Normal heart sounds.   Pulmonary:      Effort: Pulmonary effort is normal.      Breath sounds: Normal breath sounds.   Musculoskeletal:      Cervical back: Normal range of motion.   Lymphadenopathy:      Cervical: Cervical adenopathy present.   Skin:     General: Skin is warm and dry.   Neurological:      Mental Status: She is alert and oriented to person, place, and time.   Psychiatric:         Attention and Perception: She is attentive.         Mood and Affect: Mood normal.         Speech: Speech normal.         Behavior: Behavior normal.        Result Review :  The following data was reviewed by: Eden Rojas MD on 04/08/2025:  Common labs          10/28/2024    10:04   Common Labs   Glucose 86    BUN 13    Creatinine 0.97    Sodium 139    Potassium 4.3    Chloride 105    Calcium 9.6    Albumin 4.1    Total Bilirubin 0.5    Alkaline Phosphatase 110    AST (SGOT) 15    ALT (SGPT) 6    WBC 9.95    Hemoglobin 13.5    Hematocrit 40.7    Platelets 292    Total Cholesterol 239    Triglycerides 136    HDL Cholesterol 50    LDL Cholesterol  164                Assessment and Plan   Diagnoses and all orders for this visit:    1. Primary hypertension (Primary)  Assessment & Plan:  Hypertension is uncontrolled  Medication changes per orders.  Dietary sodium restriction.  Regular aerobic exercise.  Ambulatory blood pressure monitoring.  Blood pressure will be reassessedin 3 months.    Orders:  -     metoprolol succinate XL (Toprol XL) 50 MG 24 hr tablet; Take 1 tablet by mouth Daily.  Dispense: 90 tablet; Refill: 3    2. Pharyngitis, unspecified etiology  -     predniSONE (DELTASONE) 10 MG tablet; Take 1 tablet by mouth 2 (Two) Times a Day.  Dispense: 10 tablet; Refill: 0  -     azithromycin (Zithromax Z-Ricci) 250 MG tablet; Take 2 tablets the first day, then 1 tablet daily for 4 days.  Dispense: 6 tablet; Refill: 0             Follow Up   No follow-ups on file.  Patient  was given instructions and counseling regarding her condition or for health maintenance advice. Please see specific information pulled into the AVS if appropriate.

## 2025-04-30 RX ORDER — LEVOTHYROXINE SODIUM 50 UG/1
50 TABLET ORAL
Qty: 90 TABLET | Refills: 1 | Status: SHIPPED | OUTPATIENT
Start: 2025-04-30

## 2025-04-30 RX ORDER — ROSUVASTATIN CALCIUM 5 MG/1
5 TABLET, COATED ORAL DAILY
Qty: 90 TABLET | Refills: 1 | Status: SHIPPED | OUTPATIENT
Start: 2025-04-30

## 2025-06-04 ENCOUNTER — PATIENT MESSAGE (OUTPATIENT)
Dept: FAMILY MEDICINE CLINIC | Facility: CLINIC | Age: 35
End: 2025-06-04
Payer: COMMERCIAL

## 2025-06-04 RX ORDER — DICYCLOMINE HYDROCHLORIDE 10 MG/1
10 CAPSULE ORAL
Qty: 120 CAPSULE | Refills: 5 | Status: SHIPPED | OUTPATIENT
Start: 2025-06-04

## 2025-06-13 ENCOUNTER — APPOINTMENT (OUTPATIENT)
Dept: RESPIRATORY THERAPY | Facility: HOSPITAL | Age: 35
End: 2025-06-13
Payer: COMMERCIAL

## 2025-06-14 ENCOUNTER — HOSPITAL ENCOUNTER (EMERGENCY)
Facility: HOSPITAL | Age: 35
Discharge: HOME | End: 2025-06-14
Attending: EMERGENCY MEDICINE
Payer: COMMERCIAL

## 2025-06-14 ENCOUNTER — APPOINTMENT (OUTPATIENT)
Dept: GENERAL RADIOLOGY | Facility: HOSPITAL | Age: 35
End: 2025-06-14
Payer: COMMERCIAL

## 2025-06-14 VITALS
OXYGEN SATURATION: 96 % | SYSTOLIC BLOOD PRESSURE: 113 MMHG | TEMPERATURE: 97.8 F | DIASTOLIC BLOOD PRESSURE: 84 MMHG | WEIGHT: 222 LBS | HEART RATE: 86 BPM | HEIGHT: 62 IN | BODY MASS INDEX: 40.85 KG/M2 | RESPIRATION RATE: 17 BRPM

## 2025-06-14 DIAGNOSIS — R00.2 PALPITATIONS: ICD-10-CM

## 2025-06-14 LAB
ALBUMIN SERPL-MCNC: 4.3 G/DL (ref 3.5–5.2)
ALBUMIN/GLOB SERPL: 1.8 G/DL
ALP SERPL-CCNC: 118 U/L (ref 39–117)
ALT SERPL W P-5'-P-CCNC: 10 U/L (ref 1–33)
ANION GAP SERPL CALCULATED.3IONS-SCNC: 11.5 MMOL/L (ref 5–15)
AST SERPL-CCNC: 23 U/L (ref 1–32)
BASOPHILS # BLD AUTO: 0.03 10*3/MM3 (ref 0–0.2)
BASOPHILS NFR BLD AUTO: 0.3 % (ref 0–1.5)
BILIRUB SERPL-MCNC: 0.5 MG/DL (ref 0–1.2)
BUN SERPL-MCNC: 7.4 MG/DL (ref 6–20)
BUN/CREAT SERPL: 8.8 (ref 7–25)
CALCIUM SPEC-SCNC: 9.4 MG/DL (ref 8.6–10.5)
CHLORIDE SERPL-SCNC: 104 MMOL/L (ref 98–107)
CO2 SERPL-SCNC: 23.5 MMOL/L (ref 22–29)
CREAT SERPL-MCNC: 0.84 MG/DL (ref 0.57–1)
D DIMER PPP FEU-MCNC: 0.3 MCGFEU/ML (ref 0–0.5)
DEPRECATED RDW RBC AUTO: 40.3 FL (ref 37–54)
EGFRCR SERPLBLD CKD-EPI 2021: 93.1 ML/MIN/1.73
EOSINOPHIL # BLD AUTO: 0.24 10*3/MM3 (ref 0–0.4)
EOSINOPHIL NFR BLD AUTO: 2.4 % (ref 0.3–6.2)
ERYTHROCYTE [DISTWIDTH] IN BLOOD BY AUTOMATED COUNT: 12.7 % (ref 12.3–15.4)
GEN 5 1HR TROPONIN T REFLEX: <6 NG/L
GLOBULIN UR ELPH-MCNC: 2.4 GM/DL
GLUCOSE SERPL-MCNC: 123 MG/DL (ref 65–99)
HCT VFR BLD AUTO: 39.4 % (ref 34–46.6)
HGB BLD-MCNC: 13.1 G/DL (ref 12–15.9)
HOLD SPECIMEN: NORMAL
HOLD SPECIMEN: NORMAL
IMM GRANULOCYTES # BLD AUTO: 0.03 10*3/MM3 (ref 0–0.05)
IMM GRANULOCYTES NFR BLD AUTO: 0.3 % (ref 0–0.5)
LYMPHOCYTES # BLD AUTO: 1.79 10*3/MM3 (ref 0.7–3.1)
LYMPHOCYTES NFR BLD AUTO: 18 % (ref 19.6–45.3)
MAGNESIUM SERPL-MCNC: 2.2 MG/DL (ref 1.6–2.6)
MCH RBC QN AUTO: 29.1 PG (ref 26.6–33)
MCHC RBC AUTO-ENTMCNC: 33.2 G/DL (ref 31.5–35.7)
MCV RBC AUTO: 87.6 FL (ref 79–97)
MONOCYTES # BLD AUTO: 0.57 10*3/MM3 (ref 0.1–0.9)
MONOCYTES NFR BLD AUTO: 5.7 % (ref 5–12)
NEUTROPHILS NFR BLD AUTO: 7.26 10*3/MM3 (ref 1.7–7)
NEUTROPHILS NFR BLD AUTO: 73.3 % (ref 42.7–76)
NRBC BLD AUTO-RTO: 0 /100 WBC (ref 0–0.2)
PLATELET # BLD AUTO: 255 10*3/MM3 (ref 140–450)
PMV BLD AUTO: 11 FL (ref 6–12)
POTASSIUM SERPL-SCNC: 3.6 MMOL/L (ref 3.5–5.2)
PROT SERPL-MCNC: 6.7 G/DL (ref 6–8.5)
QT INTERVAL: 359 MS
QTC INTERVAL: 450 MS
RBC # BLD AUTO: 4.5 10*6/MM3 (ref 3.77–5.28)
SODIUM SERPL-SCNC: 139 MMOL/L (ref 136–145)
TROPONIN T NUMERIC DELTA: NORMAL
TROPONIN T SERPL HS-MCNC: <6 NG/L
TSH SERPL DL<=0.05 MIU/L-ACNC: 2.24 UIU/ML (ref 0.27–4.2)
WBC NRBC COR # BLD AUTO: 9.92 10*3/MM3 (ref 3.4–10.8)
WHOLE BLOOD HOLD COAG: NORMAL
WHOLE BLOOD HOLD SPECIMEN: NORMAL

## 2025-06-14 PROCEDURE — 85379 FIBRIN DEGRADATION QUANT: CPT | Performed by: EMERGENCY MEDICINE

## 2025-06-14 PROCEDURE — 80050 GENERAL HEALTH PANEL: CPT | Performed by: EMERGENCY MEDICINE

## 2025-06-14 PROCEDURE — 71045 X-RAY EXAM CHEST 1 VIEW: CPT

## 2025-06-14 PROCEDURE — 84484 ASSAY OF TROPONIN QUANT: CPT | Performed by: EMERGENCY MEDICINE

## 2025-06-14 PROCEDURE — 99284 EMERGENCY DEPT VISIT MOD MDM: CPT

## 2025-06-14 PROCEDURE — 93005 ELECTROCARDIOGRAM TRACING: CPT | Performed by: EMERGENCY MEDICINE

## 2025-06-14 PROCEDURE — 83735 ASSAY OF MAGNESIUM: CPT | Performed by: EMERGENCY MEDICINE

## 2025-06-14 PROCEDURE — 36415 COLL VENOUS BLD VENIPUNCTURE: CPT

## 2025-06-14 NOTE — ED PROVIDER NOTES
Subjective   History of Present Illness  34-year-old female with palpitations this evening onset at 10:30 PM.  Patient states the palpitations were fast associated with some lightheadedness, nausea, warm feeling in her chest, chills, lasted about 30 minutes.  Patient states she started a new birth control medicine 2 weeks ago had issues with blood pressure elevations with her birth control in the past.  Patient stopped it on Tuesday.  Patient denies any calf pain or swelling, no history of DVT PE, no recent trips or travel.  Patient was told she had an arrhythmia when she was 17 but no medicine was recommended.  Patient says she had a normal Holter monitor 10 years ago but does have intermittent palpitations.  No history of syncope      Review of Systems   Cardiovascular:  Positive for chest pain and palpitations.   Gastrointestinal:  Positive for nausea.   Neurological:  Positive for light-headedness.       Past Medical History:   Diagnosis Date    Allergic     Arrhythmia     Hyperlipidemia     Hypertension     Hypothyroidism     IUD (intrauterine device) in place     Morbid obesity     Vitamin D deficiency 09/06/2017       Allergies   Allergen Reactions    Mefoxin [Cefoxitin] Other (See Comments)     Pain above the IV site       Past Surgical History:   Procedure Laterality Date    APPENDECTOMY N/A 04/13/2023    Procedure: APPENDECTOMY LAPAROSCOPIC;  Surgeon: Cesar Flores MD;  Location: [facility] MAIN OR;  Service: General;  Laterality: N/A;    TUBAL ABDOMINAL LIGATION  12/2022    Dr. Godwin       Family History   Problem Relation Age of Onset    Multiple sclerosis Mother     Endometriosis Mother     Rheum arthritis Mother     Arthritis Mother     Other Mother         Multiple sclerosis    Hypertension Father     Hyperlipidemia Father     Heart disease Father         heart aneurysm    Sleep apnea Father     Asthma Father     Rheum arthritis Maternal Grandmother     Autoimmune disease Maternal Grandmother      Lupus Maternal Grandmother     Irritable bowel syndrome Maternal Grandmother     Arthritis Maternal Grandmother     Asthma Maternal Grandmother     Hypertension Paternal Grandmother     Hypertension Paternal Grandfather     Heart disease Paternal Grandfather     Sleep apnea Paternal Grandfather     Hyperlipidemia Paternal Grandfather     Diabetes Maternal Grandfather        Social History     Socioeconomic History    Marital status:    Tobacco Use    Smoking status: Never    Smokeless tobacco: Never   Vaping Use    Vaping status: Never Used   Substance and Sexual Activity    Alcohol use: No     Comment: caffeine <1c qd    Drug use: No    Sexual activity: Yes     Partners: Male     Birth control/protection: None     Comment:            Objective   Physical Exam  Constitutional:       Appearance: Normal appearance.   HENT:      Head: Normocephalic and atraumatic.   Cardiovascular:      Rate and Rhythm: Normal rate and regular rhythm.      Heart sounds: Normal heart sounds.   Pulmonary:      Effort: Pulmonary effort is normal.      Breath sounds: Normal breath sounds.   Abdominal:      General: Bowel sounds are normal.      Palpations: Abdomen is soft.   Musculoskeletal:         General: No swelling or tenderness.   Skin:     General: Skin is warm and dry.      Capillary Refill: Capillary refill takes less than 2 seconds.   Neurological:      General: No focal deficit present.      Mental Status: She is alert and oriented to person, place, and time.   Psychiatric:         Mood and Affect: Mood normal.         Behavior: Behavior normal.         Procedures           ED Course                                                       Medical Decision Making  Well, sinus rhythm throughout ED encounter, unremarkable workup.  Patient has an appointment with a cardiologist in July, return precautions reviewed.    Problems Addressed:  Palpitations: complicated acute illness or injury    Amount and/or Complexity of  Data Reviewed  External Data Reviewed: ECG.     Details: As below  Labs: ordered.     Details: Neg tn  Radiology: ordered and independent interpretation performed.     Details: Normal chest  ECG/medicine tests: ordered and independent interpretation performed.     Details: EKG interpretation: Sinus rhythm, rate 95, no no acute ST change appreciated, compared with tracing done on 4/13/2023, no clearly acute interval change appreciated        Final diagnoses:   Palpitations       ED Disposition  ED Disposition       ED Disposition   Discharge    Condition   Stable    Comment   --                 Follow-up with your cardiologist and/or PCP             Medication List      No changes were made to your prescriptions during this visit.            Joseph Brownlee MD  06/14/25 0304

## 2025-06-17 ENCOUNTER — PATIENT MESSAGE (OUTPATIENT)
Dept: FAMILY MEDICINE CLINIC | Facility: CLINIC | Age: 35
End: 2025-06-17
Payer: COMMERCIAL

## 2025-06-20 ENCOUNTER — OFFICE VISIT (OUTPATIENT)
Dept: FAMILY MEDICINE CLINIC | Facility: CLINIC | Age: 35
End: 2025-06-20
Payer: COMMERCIAL

## 2025-06-20 VITALS
SYSTOLIC BLOOD PRESSURE: 134 MMHG | HEIGHT: 62 IN | WEIGHT: 220.8 LBS | TEMPERATURE: 98.1 F | DIASTOLIC BLOOD PRESSURE: 99 MMHG | HEART RATE: 81 BPM | BODY MASS INDEX: 40.63 KG/M2 | OXYGEN SATURATION: 97 %

## 2025-06-20 DIAGNOSIS — R00.2 PALPITATIONS: Primary | ICD-10-CM

## 2025-06-20 NOTE — PROGRESS NOTES
"Chief Complaint  Hospital Follow Up Visit (palpitation)    Subjective        Lorena Bryson presents to [facility]  History of Present Illness  Seen in the ER on 6/14/25 for palpitations and heart rate of 144. Records reviewed.   She feels like her symptoms are due to side effects from medications so she has stopped all of her medicines.   Has an appointment with cardiologist next month.  Waiting on results of Holter monitor.     Palpitations   This is a new problem. The current episode started 1 to 4 weeks ago. The problem occurs intermittently. The problem has been gradually improving. The symptoms are aggravated by unknown. Associated symptoms include anxiety, near-syncope and shortness of breath. Pertinent negatives include no chest fullness, chest pain, coughing, dizziness, fever or syncope. She has tried bed rest for the symptoms.       Objective   Vital Signs:  /99 (BP Location: Left arm, Patient Position: Sitting, Cuff Size: Adult)   Pulse 81   Temp 98.1 °F (36.7 °C) (Temporal)   Ht 157.5 cm (62\")   Wt 100 kg (220 lb 12.8 oz)   SpO2 97%   BMI 40.38 kg/m²   Estimated body mass index is 40.38 kg/m² as calculated from the following:    Height as of this encounter: 157.5 cm (62\").    Weight as of this encounter: 100 kg (220 lb 12.8 oz).            Physical Exam  Vitals and nursing note reviewed.   Constitutional:       General: She is not in acute distress.     Appearance: She is well-developed.   HENT:      Head: Normocephalic.   Eyes:      General: Lids are normal.      Conjunctiva/sclera: Conjunctivae normal.   Neck:      Thyroid: No thyroid mass or thyromegaly.      Trachea: Trachea normal.   Cardiovascular:      Rate and Rhythm: Normal rate and regular rhythm.      Heart sounds: Normal heart sounds.   Pulmonary:      Effort: Pulmonary effort is normal.      Breath sounds: Normal breath sounds.   Abdominal:      Palpations: Abdomen is soft.   Musculoskeletal: "      Cervical back: Normal range of motion.   Lymphadenopathy:      Cervical: No cervical adenopathy.   Skin:     General: Skin is warm and dry.   Neurological:      Mental Status: She is alert and oriented to person, place, and time.   Psychiatric:         Attention and Perception: She is attentive.         Mood and Affect: Mood normal.         Speech: Speech normal.         Behavior: Behavior normal.        Result Review :  The following data was reviewed by: Eden Rojas MD on 06/20/2025:  Common labs          10/28/2024    10:04 6/14/2025    00:50   Common Labs   Glucose 86  123    BUN 13  7.4    Creatinine 0.97  0.84    Sodium 139  139    Potassium 4.3  3.6    Chloride 105  104    Calcium 9.6  9.4    Albumin 4.1  4.3    Total Bilirubin 0.5  0.5    Alkaline Phosphatase 110  118    AST (SGOT) 15  23    ALT (SGPT) 6  10    WBC 9.95  9.92    Hemoglobin 13.5  13.1    Hematocrit 40.7  39.4    Platelets 292  255    Total Cholesterol 239     Triglycerides 136     HDL Cholesterol 50     LDL Cholesterol  164                 Assessment and Plan   Diagnoses and all orders for this visit:    1. Palpitations (Primary)  Assessment & Plan:  Avoid caffeine.  Stay hydrated.  Monitor symptoms.  Follow up with cardiology as planned.     Orders:  -     CBC & Differential  -     Comprehensive Metabolic Panel  -     TSH  -     Lipid Panel  -     FSH & LH; Future             Follow Up   No follow-ups on file.  Patient was given instructions and counseling regarding her condition or for health maintenance advice. Please see specific information pulled into the AVS if appropriate.

## 2025-06-26 LAB
CV ZIO BASELINE AVG BPM: 81 BPM
CV ZIO BASELINE BPM HIGH: 144 BPM
CV ZIO BASELINE BPM LOW: 43 BPM
CV ZIO DEVICE ANALYSIS TIME: NORMAL
CV ZIO ECT SVE COUNT: 79 EPISODES
CV ZIO ECT SVE CPLT COUNT: 0 EPISODES
CV ZIO ECT SVE CPLT FREQ: 0
CV ZIO ECT SVE FREQ: NORMAL
CV ZIO ECT SVE TPLT COUNT: 0 EPISODES
CV ZIO ECT SVE TPLT FREQ: 0
CV ZIO ECT VE COUNT: 23 EPISODES
CV ZIO ECT VE CPLT COUNT: 0 EPISODES
CV ZIO ECT VE CPLT FREQ: 0
CV ZIO ECT VE FREQ: NORMAL
CV ZIO ECT VE TPLT COUNT: 0 EPISODES
CV ZIO ECT VE TPLT FREQ: 0
CV ZIO ECTOPIC SVE COUPLET RAW PERCENT: 0 %
CV ZIO ECTOPIC SVE ISOLATED PERCENT: 0.02 %
CV ZIO ECTOPIC SVE TRIPLET RAW PERCENT: 0 %
CV ZIO ECTOPIC VE COUPLET RAW PERCENT: 0 %
CV ZIO ECTOPIC VE ISOLATED PERCENT: 0.01 %
CV ZIO ECTOPIC VE TRIPLET RAW PERCENT: 0 %
CV ZIO ENROLLMENT END: NORMAL
CV ZIO ENROLLMENT START: NORMAL
CV ZIO PATIENT EVENTS DIARIES: 1
CV ZIO PATIENT EVENTS TRIGGERS: 35
CV ZIO PAUSE COUNT: 0
CV ZIO PRESCRIPTION STATUS: NORMAL
CV ZIO SVT COUNT: 0
CV ZIO TOTAL  ENROLLMENT PERIOD: NORMAL
CV ZIO VT COUNT: 0

## 2025-07-17 ENCOUNTER — LAB (OUTPATIENT)
Dept: LAB | Facility: HOSPITAL | Age: 35
End: 2025-07-17
Payer: COMMERCIAL

## 2025-07-17 DIAGNOSIS — R00.2 PALPITATIONS: ICD-10-CM

## 2025-07-17 LAB
ALBUMIN SERPL-MCNC: 4 G/DL (ref 3.5–5.2)
ALBUMIN/GLOB SERPL: 1.4 G/DL
ALP SERPL-CCNC: 110 U/L (ref 39–117)
ALT SERPL W P-5'-P-CCNC: 14 U/L (ref 1–33)
ANION GAP SERPL CALCULATED.3IONS-SCNC: 11.7 MMOL/L (ref 5–15)
AST SERPL-CCNC: 29 U/L (ref 1–32)
BASOPHILS # BLD AUTO: 0.04 10*3/MM3 (ref 0–0.2)
BASOPHILS NFR BLD AUTO: 0.4 % (ref 0–1.5)
BILIRUB SERPL-MCNC: 0.6 MG/DL (ref 0–1.2)
BUN SERPL-MCNC: 11 MG/DL (ref 6–20)
BUN/CREAT SERPL: 12.5 (ref 7–25)
CALCIUM SPEC-SCNC: 9.3 MG/DL (ref 8.6–10.5)
CHLORIDE SERPL-SCNC: 104 MMOL/L (ref 98–107)
CHOLEST SERPL-MCNC: 261 MG/DL (ref 0–200)
CO2 SERPL-SCNC: 23.3 MMOL/L (ref 22–29)
CREAT SERPL-MCNC: 0.88 MG/DL (ref 0.57–1)
DEPRECATED RDW RBC AUTO: 41.2 FL (ref 37–54)
EGFRCR SERPLBLD CKD-EPI 2021: 88 ML/MIN/1.73
EOSINOPHIL # BLD AUTO: 0.18 10*3/MM3 (ref 0–0.4)
EOSINOPHIL NFR BLD AUTO: 1.9 % (ref 0.3–6.2)
ERYTHROCYTE [DISTWIDTH] IN BLOOD BY AUTOMATED COUNT: 13 % (ref 12.3–15.4)
FSH SERPL-ACNC: 6.11 MIU/ML
GLOBULIN UR ELPH-MCNC: 2.9 GM/DL
GLUCOSE SERPL-MCNC: 90 MG/DL (ref 65–99)
HCT VFR BLD AUTO: 40.5 % (ref 34–46.6)
HDLC SERPL-MCNC: 53 MG/DL (ref 40–60)
HGB BLD-MCNC: 13.5 G/DL (ref 12–15.9)
IMM GRANULOCYTES # BLD AUTO: 0.04 10*3/MM3 (ref 0–0.05)
IMM GRANULOCYTES NFR BLD AUTO: 0.4 % (ref 0–0.5)
LDLC SERPL CALC-MCNC: 183 MG/DL (ref 0–100)
LDLC/HDLC SERPL: 3.4 {RATIO}
LH SERPL-ACNC: 17 MIU/ML
LYMPHOCYTES # BLD AUTO: 2.73 10*3/MM3 (ref 0.7–3.1)
LYMPHOCYTES NFR BLD AUTO: 28.6 % (ref 19.6–45.3)
MCH RBC QN AUTO: 29.5 PG (ref 26.6–33)
MCHC RBC AUTO-ENTMCNC: 33.3 G/DL (ref 31.5–35.7)
MCV RBC AUTO: 88.4 FL (ref 79–97)
MONOCYTES # BLD AUTO: 0.76 10*3/MM3 (ref 0.1–0.9)
MONOCYTES NFR BLD AUTO: 8 % (ref 5–12)
NEUTROPHILS NFR BLD AUTO: 5.78 10*3/MM3 (ref 1.7–7)
NEUTROPHILS NFR BLD AUTO: 60.7 % (ref 42.7–76)
NRBC BLD AUTO-RTO: 0 /100 WBC (ref 0–0.2)
PLATELET # BLD AUTO: 286 10*3/MM3 (ref 140–450)
PMV BLD AUTO: 10.7 FL (ref 6–12)
POTASSIUM SERPL-SCNC: 4.2 MMOL/L (ref 3.5–5.2)
PROT SERPL-MCNC: 6.9 G/DL (ref 6–8.5)
RBC # BLD AUTO: 4.58 10*6/MM3 (ref 3.77–5.28)
SODIUM SERPL-SCNC: 139 MMOL/L (ref 136–145)
TRIGL SERPL-MCNC: 140 MG/DL (ref 0–150)
TSH SERPL DL<=0.05 MIU/L-ACNC: 4.19 UIU/ML (ref 0.27–4.2)
VLDLC SERPL-MCNC: 25 MG/DL (ref 5–40)
WBC NRBC COR # BLD AUTO: 9.53 10*3/MM3 (ref 3.4–10.8)

## 2025-07-17 PROCEDURE — 83002 ASSAY OF GONADOTROPIN (LH): CPT

## 2025-07-17 PROCEDURE — 83001 ASSAY OF GONADOTROPIN (FSH): CPT

## 2025-07-17 PROCEDURE — 80050 GENERAL HEALTH PANEL: CPT | Performed by: FAMILY MEDICINE

## 2025-07-17 PROCEDURE — 80061 LIPID PANEL: CPT | Performed by: FAMILY MEDICINE

## (undated) DEVICE — ENDOPATH XCEL BLADELESS TROCARS WITH STABILITY SLEEVES: Brand: ENDOPATH XCEL

## (undated) DEVICE — SOL NACL 0.9PCT 1000ML

## (undated) DEVICE — ENDOPOUCH RETRIEVER SPECIMEN RETRIEVAL BAGS: Brand: ENDOPOUCH RETRIEVER

## (undated) DEVICE — PASS SUT PRO BARIATRIC XL W/TROC SWABS

## (undated) DEVICE — CUFF SCD HEMOFORCE SEQ CALF STD MD

## (undated) DEVICE — ENDOPATH XCEL WITH OPTIVIEW TECHNOLOGY UNIVERSAL TROCAR STABILITY SLEEVES: Brand: ENDOPATH XCEL OPTIVIEW

## (undated) DEVICE — GENERAL LAPAROSCOPY CDS: Brand: MEDLINE INDUSTRIES, INC.

## (undated) DEVICE — CVR HNDL LT SURG ACCSSRY BLU STRL

## (undated) DEVICE — INSUFFLATION TUBING SET, ENDOFLATOR 50: Brand: N.A.

## (undated) DEVICE — ENDOPATH XCEL WITH OPTIVIEW TECHNOLOGY BLADELESS TROCARS WITH STABILITY SLEEVES: Brand: ENDOPATH XCEL OPTIVIEW

## (undated) DEVICE — KT SURG TURNOVER 050

## (undated) DEVICE — SOL IRR NACL 0.9PCT 1000ML

## (undated) DEVICE — TOTAL TRAY, DB, 100% SILI FOLEY, 16FR 10: Brand: MEDLINE

## (undated) DEVICE — UNDERGLV SURG BIOGEL INDICAT PF 8 GRN

## (undated) DEVICE — ADHS SKIN PREMIERPRO EXOFIN TOPICAL HI/VISC .5ML

## (undated) DEVICE — MARYLAND JAW LAPAROSCOPIC SEALER/DIVIDER COATED: Brand: LIGASURE

## (undated) DEVICE — GLV SURG BIOGEL LTX PF 7 1/2

## (undated) DEVICE — ANTIBACTERIAL UNDYED BRAIDED (POLYGLACTIN 910), SYNTHETIC ABSORBABLE SUTURE: Brand: COATED VICRYL

## (undated) DEVICE — ENDOPATH ETS-FLEX45 ARTICULATING ENDOSCOPIC LINEAR CUTTER, NO RELOAD: Brand: ENDOPATH

## (undated) DEVICE — SUT VIC 0 UR6 27IN VCP603H